# Patient Record
Sex: MALE | Race: WHITE | NOT HISPANIC OR LATINO | Employment: OTHER | URBAN - METROPOLITAN AREA
[De-identification: names, ages, dates, MRNs, and addresses within clinical notes are randomized per-mention and may not be internally consistent; named-entity substitution may affect disease eponyms.]

---

## 2017-02-03 RX ORDER — ONDANSETRON 4 MG/1
4 TABLET, ORALLY DISINTEGRATING ORAL
COMMUNITY
Start: 2016-12-03 | End: 2019-07-30

## 2017-02-03 RX ORDER — GABAPENTIN 600 MG/1
600 TABLET ORAL
COMMUNITY
Start: 2016-10-07 | End: 2019-07-30

## 2017-02-03 RX ORDER — INSULIN GLARGINE 100 [IU]/ML
20 INJECTION, SOLUTION SUBCUTANEOUS
COMMUNITY
Start: 2016-11-21 | End: 2023-04-04

## 2017-02-03 RX ORDER — OXYCODONE AND ACETAMINOPHEN 10; 325 MG/1; MG/1
TABLET ORAL
COMMUNITY
Start: 2016-12-22 | End: 2024-02-29

## 2017-02-03 RX ORDER — LUBIPROSTONE 24 UG/1
24 CAPSULE ORAL
COMMUNITY
Start: 2016-10-07 | End: 2023-04-04

## 2019-07-25 RX ORDER — PREGABALIN 100 MG/1
100 CAPSULE ORAL
COMMUNITY
Start: 2019-02-05 | End: 2023-04-04

## 2019-07-26 DIAGNOSIS — Z96.643 H/O TOTAL HIP ARTHROPLASTY, BILATERAL: Primary | ICD-10-CM

## 2019-07-30 ENCOUNTER — OFFICE VISIT (OUTPATIENT)
Dept: ORTHOPEDICS | Facility: CLINIC | Age: 64
End: 2019-07-30
Payer: MEDICARE

## 2019-07-30 ENCOUNTER — HOSPITAL ENCOUNTER (OUTPATIENT)
Dept: RADIOLOGY | Facility: HOSPITAL | Age: 64
Discharge: HOME | End: 2019-07-30
Attending: ORTHOPAEDIC SURGERY
Payer: MEDICARE

## 2019-07-30 VITALS
HEIGHT: 72 IN | SYSTOLIC BLOOD PRESSURE: 138 MMHG | HEART RATE: 112 BPM | DIASTOLIC BLOOD PRESSURE: 92 MMHG | BODY MASS INDEX: 23.03 KG/M2 | WEIGHT: 170 LBS

## 2019-07-30 DIAGNOSIS — Z96.643 H/O TOTAL HIP ARTHROPLASTY, BILATERAL: ICD-10-CM

## 2019-07-30 DIAGNOSIS — M54.16 LUMBAR RADICULOPATHY: ICD-10-CM

## 2019-07-30 DIAGNOSIS — Z96.641 HISTORY OF TOTAL RIGHT HIP ARTHROPLASTY: Primary | ICD-10-CM

## 2019-07-30 PROCEDURE — 99213 OFFICE O/P EST LOW 20 MIN: CPT | Mod: PBBFAC,25,PN | Performed by: ORTHOPAEDIC SURGERY

## 2019-07-30 PROCEDURE — 73521 XR ORTHO PELVIS_HIP POST OP BILATERAL: ICD-10-PCS | Mod: 26,,, | Performed by: RADIOLOGY

## 2019-07-30 PROCEDURE — 99999 PR PBB SHADOW E&M-EST. PATIENT-LVL III: ICD-10-PCS | Mod: PBBFAC,,, | Performed by: ORTHOPAEDIC SURGERY

## 2019-07-30 PROCEDURE — 73521 X-RAY EXAM HIPS BI 2 VIEWS: CPT | Mod: 26,,, | Performed by: RADIOLOGY

## 2019-07-30 PROCEDURE — 99999 PR PBB SHADOW E&M-EST. PATIENT-LVL III: CPT | Mod: PBBFAC,,, | Performed by: ORTHOPAEDIC SURGERY

## 2019-07-30 PROCEDURE — 99214 PR OFFICE/OUTPT VISIT, EST, LEVL IV, 30-39 MIN: ICD-10-PCS | Mod: S$PBB,,, | Performed by: ORTHOPAEDIC SURGERY

## 2019-07-30 PROCEDURE — 73521 X-RAY EXAM HIPS BI 2 VIEWS: CPT | Mod: TC,PO

## 2019-07-30 PROCEDURE — 99214 OFFICE O/P EST MOD 30 MIN: CPT | Mod: S$PBB,,, | Performed by: ORTHOPAEDIC SURGERY

## 2019-07-30 RX ORDER — AMITRIPTYLINE HYDROCHLORIDE 10 MG/1
TABLET, FILM COATED ORAL
Refills: 0 | COMMUNITY
Start: 2019-07-25 | End: 2023-04-04

## 2019-07-30 NOTE — PROGRESS NOTES
Chief Complaint   Patient presents with    Hip Pain     bilateral hip pain       HPI:   This is a 63 y.o. who returns today status post bilateral ESTRELLA at [facility] . Patient has been WBAT.  Pain is aching. No numbness or tingling. No associated signs or symptoms.    Past Medical History:   Diagnosis Date    Diabetes mellitus, type 2      Past Surgical History:   Procedure Laterality Date    HIP SURGERY Bilateral     done at [place]    JOINT REPLACEMENT      bilateral hip replacement    KNEE ARTHROSCOPY      KNEE SURGERY      right knee replacement x2,      Current Outpatient Medications on File Prior to Visit   Medication Sig Dispense Refill    amitriptyline (ELAVIL) 10 MG tablet TAKE 1 TABLET BY MOUTH 1 TO 2 TIMES A NIGHT AS NEEDED MAY CAUSE DROWSINESS  0    INSULIN ASPART (NOVOLOG SUBQ) Inject 5 Units into the skin 3 (three) times daily.      lubiprostone (AMITIZA) 24 MCG Cap Take 24 mcg by mouth.      oxycodone-acetaminophen (PERCOCET)  mg per tablet       pregabalin (LYRICA) 100 MG capsule Take 100 mg by mouth.      insulin glargine (LANTUS SOLOSTAR) 100 unit/mL (3 mL) InPn pen Inject 20 Units into the skin.       No current facility-administered medications on file prior to visit.      Review of patient's allergies indicates:  No Known Allergies  Family History   Problem Relation Age of Onset    Collagen disease Neg Hx      Social History     Socioeconomic History    Marital status:      Spouse name: Not on file    Number of children: Not on file    Years of education: Not on file    Highest education level: Not on file   Occupational History    Not on file   Social Needs    Financial resource strain: Not on file    Food insecurity:     Worry: Not on file     Inability: Not on file    Transportation needs:     Medical: Not on file     Non-medical: Not on file   Tobacco Use    Smoking status: Never Smoker    Smokeless tobacco: Never Used   Substance and Sexual Activity     Alcohol use: No     Alcohol/week: 0.0 oz    Drug use: No    Sexual activity: Not on file   Lifestyle    Physical activity:     Days per week: Not on file     Minutes per session: Not on file    Stress: Not on file   Relationships    Social connections:     Talks on phone: Not on file     Gets together: Not on file     Attends Jew service: Not on file     Active member of club or organization: Not on file     Attends meetings of clubs or organizations: Not on file     Relationship status: Not on file   Other Topics Concern    Not on file   Social History Narrative    Not on file       Review of Systems:  Constitutional:  Denies fever or chills   Eyes:  Denies change in visual acuity   HENT:  Denies nasal congestion or sore throat   Respiratory:  Denies cough or shortness of breath   Cardiovascular:  Denies chest pain or edema   GI:  Denies abdominal pain, nausea, vomiting, bloody stools or diarrhea   :  Denies dysuria   Integument:  Denies rash   Neurologic:  Denies headache, focal weakness or sensory changes   Endocrine:  Denies polyuria or polydipsia   Lymphatic:  Denies swollen glands   Psychiatric:  Denies depression or anxiety     Physical Exam:   Constitutional:  Well developed, well nourished, no acute distress, non-toxic appearance   Integument:  Well hydrated, no rash   Lymphatic:  No lymphadenopathy noted   Neurologic:  Alert & oriented x 3  Psychiatric:  Speech and behavior appropriate     bilateral Hip Exam     Tenderness   The patient is experiencing no tenderness.   Incision healed    Range of Motion   The patient has normal right hip ROM.    Muscle Strength   Flexion: 4/5     Other   Erythema: absent  Sensation: normal  Pulse: present      X-rays were performed today, personally reviewed by me and findings discussed with the patient.  4 views of the bilateral hip show implants intact in good position      There are no diagnoses linked to this encounter.    Because he is mainly  complaining of radiculopathy will refer back to Dr. Villavicencio for treatment. Will revise hip once radiculopathy improved. RTC as needed

## 2019-08-09 PROBLEM — Z96.641 HISTORY OF TOTAL RIGHT HIP ARTHROPLASTY: Status: ACTIVE | Noted: 2019-08-09

## 2019-08-09 PROBLEM — M54.16 LUMBAR RADICULOPATHY: Status: ACTIVE | Noted: 2019-08-09

## 2019-08-14 DIAGNOSIS — M25.561 RIGHT KNEE PAIN, UNSPECIFIED CHRONICITY: Primary | ICD-10-CM

## 2019-12-30 DIAGNOSIS — M25.561 RIGHT KNEE PAIN, UNSPECIFIED CHRONICITY: Primary | ICD-10-CM

## 2020-10-10 DIAGNOSIS — Z96.641 HISTORY OF TOTAL RIGHT HIP ARTHROPLASTY: Primary | ICD-10-CM

## 2020-10-21 DIAGNOSIS — Z96.643 H/O TOTAL HIP ARTHROPLASTY, BILATERAL: Primary | ICD-10-CM

## 2020-10-22 ENCOUNTER — HOSPITAL ENCOUNTER (OUTPATIENT)
Dept: RADIOLOGY | Facility: HOSPITAL | Age: 65
Discharge: HOME | End: 2020-10-22
Attending: NURSE PRACTITIONER
Payer: MEDICARE

## 2020-10-22 ENCOUNTER — OFFICE VISIT (OUTPATIENT)
Dept: ORTHOPEDICS | Facility: CLINIC | Age: 65
End: 2020-10-22
Payer: MEDICARE

## 2020-10-22 VITALS
WEIGHT: 170 LBS | HEART RATE: 51 BPM | HEIGHT: 72 IN | BODY MASS INDEX: 23.03 KG/M2 | SYSTOLIC BLOOD PRESSURE: 153 MMHG | DIASTOLIC BLOOD PRESSURE: 85 MMHG

## 2020-10-22 DIAGNOSIS — Z96.641 HISTORY OF TOTAL RIGHT HIP ARTHROPLASTY: ICD-10-CM

## 2020-10-22 DIAGNOSIS — M75.41 IMPINGEMENT SYNDROME, SHOULDER, RIGHT: ICD-10-CM

## 2020-10-22 DIAGNOSIS — M70.61 GREATER TROCHANTERIC BURSITIS OF RIGHT HIP: Primary | ICD-10-CM

## 2020-10-22 DIAGNOSIS — M25.511 RIGHT SHOULDER PAIN: ICD-10-CM

## 2020-10-22 PROBLEM — M70.62 GREATER TROCHANTERIC BURSITIS OF LEFT HIP: Status: ACTIVE | Noted: 2020-10-22

## 2020-10-22 PROCEDURE — 73521 XR ORTHO PELVIS_HIP POST OP BILATERAL: ICD-10-PCS | Mod: 26,,, | Performed by: RADIOLOGY

## 2020-10-22 PROCEDURE — 20610 DRAIN/INJ JOINT/BURSA W/O US: CPT | Mod: PBBFAC,PN | Performed by: ORTHOPAEDIC SURGERY

## 2020-10-22 PROCEDURE — 73030 X-RAY EXAM OF SHOULDER: CPT | Mod: 26,RT,, | Performed by: RADIOLOGY

## 2020-10-22 PROCEDURE — 73521 X-RAY EXAM HIPS BI 2 VIEWS: CPT | Mod: 26,,, | Performed by: RADIOLOGY

## 2020-10-22 PROCEDURE — 99999 PR PBB SHADOW E&M-EST. PATIENT-LVL III: ICD-10-PCS | Mod: PBBFAC,,, | Performed by: ORTHOPAEDIC SURGERY

## 2020-10-22 PROCEDURE — 99213 OFFICE O/P EST LOW 20 MIN: CPT | Mod: PBBFAC,25,PN | Performed by: ORTHOPAEDIC SURGERY

## 2020-10-22 PROCEDURE — 99214 OFFICE O/P EST MOD 30 MIN: CPT | Mod: S$PBB,25,, | Performed by: ORTHOPAEDIC SURGERY

## 2020-10-22 PROCEDURE — 99214 PR OFFICE/OUTPT VISIT, EST, LEVL IV, 30-39 MIN: ICD-10-PCS | Mod: S$PBB,25,, | Performed by: ORTHOPAEDIC SURGERY

## 2020-10-22 PROCEDURE — 73030 X-RAY EXAM OF SHOULDER: CPT | Mod: TC,PO,RT

## 2020-10-22 PROCEDURE — 73521 X-RAY EXAM HIPS BI 2 VIEWS: CPT | Mod: TC,PO

## 2020-10-22 PROCEDURE — 99999 PR PBB SHADOW E&M-EST. PATIENT-LVL III: CPT | Mod: PBBFAC,,, | Performed by: ORTHOPAEDIC SURGERY

## 2020-10-22 PROCEDURE — 20610 LARGE JOINT ASPIRATION/INJECTION: R SUBACROMIAL BURSA: ICD-10-PCS | Mod: S$PBB,RT,, | Performed by: ORTHOPAEDIC SURGERY

## 2020-10-22 PROCEDURE — 73030 XR SHOULDER TRAUMA 3 VIEW RIGHT: ICD-10-PCS | Mod: 26,RT,, | Performed by: RADIOLOGY

## 2020-10-22 RX ORDER — OXYCODONE HYDROCHLORIDE 15 MG/1
TABLET ORAL
COMMUNITY
Start: 2020-10-07

## 2020-10-22 RX ORDER — METFORMIN HYDROCHLORIDE 1000 MG/1
1000 TABLET ORAL 2 TIMES DAILY WITH MEALS
COMMUNITY
End: 2024-02-29

## 2020-10-22 RX ORDER — GABAPENTIN 100 MG/1
100 CAPSULE ORAL 4 TIMES DAILY
COMMUNITY
Start: 2020-09-29 | End: 2023-04-04

## 2020-10-22 RX ORDER — ATORVASTATIN CALCIUM 40 MG/1
TABLET, FILM COATED ORAL
COMMUNITY
Start: 2020-07-15

## 2020-10-22 RX ADMIN — TRIAMCINOLONE ACETONIDE 40 MG: 40 INJECTION, SUSPENSION INTRA-ARTICULAR; INTRAMUSCULAR at 08:10

## 2020-10-22 NOTE — PROGRESS NOTES
Chief Complaint   Patient presents with    Right Shoulder - Pain    Left Hip - Pain    Right Hip - Pain      HPI:   This is a 65 y.o. who presents to clinic today for right hip and right shoulder pain for the past 1 months after no known trauma. Complains of pain while sleeping on side and when descending stairs. Pain is dull. No numbness or tingling. No associated signs or symptoms. He is status post bilateral ESTRELLA at [place].       Past Medical History:   Diagnosis Date    Diabetes mellitus, type 2      Past Surgical History:   Procedure Laterality Date    HIP SURGERY Bilateral     done at [place]    JOINT REPLACEMENT      bilateral hip replacement    KNEE ARTHROSCOPY      KNEE SURGERY      right knee replacement x2,      Current Outpatient Medications on File Prior to Visit   Medication Sig Dispense Refill    amitriptyline (ELAVIL) 10 MG tablet TAKE 1 TABLET BY MOUTH 1 TO 2 TIMES A NIGHT AS NEEDED MAY CAUSE DROWSINESS  0    gabapentin (NEURONTIN) 100 MG capsule Take 100 mg by mouth 4 (four) times daily.      INSULIN ASPART (NOVOLOG SUBQ) Inject 5 Units into the skin 3 (three) times daily.      lubiprostone (AMITIZA) 24 MCG Cap Take 24 mcg by mouth.      metFORMIN (GLUCOPHAGE) 1000 MG tablet Take 1,000 mg by mouth 2 (two) times daily with meals.      oxyCODONE (ROXICODONE) 15 MG Tab TAKE ONE TABLET BY MOUTH EVERY TWELVE HOURS AS NEEDED FOR PAIN MAY CAUSE DROWSINESS CAN FILL 10-7-20      atorvastatin (LIPITOR) 40 MG tablet Take 1 tablet (40 mg total) by mouth daily      insulin glargine (LANTUS SOLOSTAR) 100 unit/mL (3 mL) InPn pen Inject 20 Units into the skin.      oxycodone-acetaminophen (PERCOCET)  mg per tablet       pregabalin (LYRICA) 100 MG capsule Take 100 mg by mouth.       No current facility-administered medications on file prior to visit.      Review of patient's allergies indicates:  No Known Allergies  Family History   Problem Relation Age of Onset    Collagen  disease Neg Hx      Social History     Socioeconomic History    Marital status:      Spouse name: Not on file    Number of children: Not on file    Years of education: Not on file    Highest education level: Not on file   Occupational History    Not on file   Social Needs    Financial resource strain: Not on file    Food insecurity     Worry: Not on file     Inability: Not on file    Transportation needs     Medical: Not on file     Non-medical: Not on file   Tobacco Use    Smoking status: Never Smoker    Smokeless tobacco: Never Used   Substance and Sexual Activity    Alcohol use: No     Alcohol/week: 0.0 standard drinks    Drug use: No    Sexual activity: Not on file   Lifestyle    Physical activity     Days per week: Not on file     Minutes per session: Not on file    Stress: Not on file   Relationships    Social connections     Talks on phone: Not on file     Gets together: Not on file     Attends Worship service: Not on file     Active member of club or organization: Not on file     Attends meetings of clubs or organizations: Not on file     Relationship status: Not on file   Other Topics Concern    Not on file   Social History Narrative    Not on file       Review of Systems:  Constitutional:  Denies fever or chills   Eyes:  Denies change in visual acuity   HENT:  Denies nasal congestion or sore throat   Respiratory:  Denies cough or shortness of breath   Cardiovascular:  Denies chest pain or edema   GI:  Denies abdominal pain, nausea, vomiting, bloody stools or diarrhea   :  Denies dysuria   Integument:  Denies rash   Neurologic:  Denies headache, focal weakness or sensory changes   Endocrine:  Denies polyuria or polydipsia   Lymphatic:  Denies swollen glands   Psychiatric:  Denies depression or anxiety     Physical Exam:   Constitutional:  Well developed, well nourished, no acute distress, non-toxic appearance   Integument:  Well hydrated, no rash   Lymphatic:  No lymphadenopathy  noted   Neurologic:  Alert & oriented x 3  Psychiatric:  Speech and behavior appropriate     Bilateral Shoulder Exam    left Shoulder Exam   Shoulder exam performed same as contralateral side and is normal.    right Shoulder Exam   Tenderness   Shoulder tenderness location: diffusely about shoulder.    Range of Motion   Forward Flexion: abnormal   External Rotation: abnormal     Muscle Strength   Supraspinatus: 4/5     Tests   Hawkin's test: positive  Impingement: positive    Other   Erythema: absent  Sensation: normal  Pulse: present         Bilateral Hip Exam    Right hip    Tenderness   The patient is experiencing tenderness in the greater trochanter.    Range of Motion   The patient has normal hip ROM.    Muscle Strength   Abduction: 4/5   Other   Erythema: absent  Sensation: normal  Pulse: present      Left hip  Exam performed same as contralateral side and is normal        X-rays were performed today, personally reviewed by me and findings discussed with the patient.  4 views of the bilateral hip show implants intact in good position    Greater trochanteric bursitis of right hip  -     Large Joint Aspiration/Injection: R greater trochanteric bursa    Impingement syndrome, shoulder, right  -     Large Joint Aspiration/Injection: R subacromial bursa           Using an aseptic technique, I injected 5 cc of lidocaine 1% without and 1 cc of kenalog 40mg into the right Hip and right shoulder. The patient tolerated this well. I will have them return to clinic in 6 weeks.

## 2020-10-22 NOTE — PROCEDURES
Large Joint Aspiration/Injection: R greater trochanteric bursa    Date/Time: 10/22/2020 8:45 AM  Performed by: Jorge Trevizo MD  Authorized by: Jorge Trevizo MD     Consent Done?:  Yes (Verbal)  Indications:  Pain  Timeout: prior to procedure the correct patient, procedure, and site was verified    Prep: patient was prepped and draped in usual sterile fashion      Local anesthesia used?: Yes    Local anesthetic:  Lidocaine 1% without epinephrine  Anesthetic total (ml):  5      Details:  Needle Size:  22 G  Approach:  Lateral  Location:  Hip  Site:  R greater trochanteric bursa  Medications:  40 mg triamcinolone acetonide 40 mg/mL  Patient tolerance:  Patient tolerated the procedure well with no immediate complications

## 2020-10-22 NOTE — PROCEDURES
Large Joint Aspiration/Injection: R subacromial bursa    Date/Time: 10/22/2020 8:45 AM  Performed by: Jorge Trevizo MD  Authorized by: Jorge Trevizo MD     Consent Done?:  Yes (Verbal)  Indications:  Pain  Timeout: prior to procedure the correct patient, procedure, and site was verified    Prep: patient was prepped and draped in usual sterile fashion      Local anesthesia used?: Yes    Local anesthetic:  Lidocaine 1% without epinephrine  Anesthetic total (ml):  5      Details:  Needle Size:  22 G  Ultrasonic Guidance for needle placement?: No    Approach:  Posterior  Location:  Shoulder  Site:  R subacromial bursa  Medications:  40 mg triamcinolone acetonide 40 mg/mL  Patient tolerance:  Patient tolerated the procedure well with no immediate complications

## 2020-11-06 RX ORDER — TRIAMCINOLONE ACETONIDE 40 MG/ML
40 INJECTION, SUSPENSION INTRA-ARTICULAR; INTRAMUSCULAR
Status: DISCONTINUED | OUTPATIENT
Start: 2020-10-22 | End: 2020-11-06 | Stop reason: HOSPADM

## 2021-05-04 ENCOUNTER — PATIENT MESSAGE (OUTPATIENT)
Dept: RESEARCH | Facility: HOSPITAL | Age: 66
End: 2021-05-04

## 2021-11-22 ENCOUNTER — PATIENT MESSAGE (OUTPATIENT)
Dept: ORTHOPEDICS | Facility: CLINIC | Age: 66
End: 2021-11-22
Payer: MEDICARE

## 2021-12-06 DIAGNOSIS — G89.29 CHRONIC PAIN OF RIGHT KNEE: Primary | ICD-10-CM

## 2021-12-06 DIAGNOSIS — M25.561 CHRONIC PAIN OF RIGHT KNEE: Primary | ICD-10-CM

## 2022-01-20 DIAGNOSIS — M79.604 PAIN OF RIGHT LOWER EXTREMITY: Primary | ICD-10-CM

## 2023-01-03 DIAGNOSIS — M25.551 BILATERAL HIP PAIN: Primary | ICD-10-CM

## 2023-01-03 DIAGNOSIS — M25.552 BILATERAL HIP PAIN: Primary | ICD-10-CM

## 2023-03-28 DIAGNOSIS — M25.561 CHRONIC PAIN OF RIGHT KNEE: ICD-10-CM

## 2023-03-28 DIAGNOSIS — M25.552 BILATERAL HIP PAIN: Primary | ICD-10-CM

## 2023-03-28 DIAGNOSIS — G89.29 CHRONIC PAIN OF RIGHT KNEE: ICD-10-CM

## 2023-03-28 DIAGNOSIS — M25.551 BILATERAL HIP PAIN: Primary | ICD-10-CM

## 2023-04-04 ENCOUNTER — HOSPITAL ENCOUNTER (OUTPATIENT)
Dept: RADIOLOGY | Facility: HOSPITAL | Age: 68
Discharge: HOME | End: 2023-04-04
Attending: ORTHOPAEDIC SURGERY
Payer: MEDICARE

## 2023-04-04 ENCOUNTER — OFFICE VISIT (OUTPATIENT)
Dept: ORTHOPEDICS | Facility: CLINIC | Age: 68
End: 2023-04-04
Payer: MEDICARE

## 2023-04-04 VITALS — WEIGHT: 171 LBS | HEIGHT: 72 IN | BODY MASS INDEX: 23.16 KG/M2

## 2023-04-04 DIAGNOSIS — G89.29 CHRONIC PAIN OF RIGHT KNEE: ICD-10-CM

## 2023-04-04 DIAGNOSIS — M76.892 ADDUCTOR TENDINITIS OF LEFT HIP: ICD-10-CM

## 2023-04-04 DIAGNOSIS — M25.551 BILATERAL HIP PAIN: ICD-10-CM

## 2023-04-04 DIAGNOSIS — M25.552 BILATERAL HIP PAIN: ICD-10-CM

## 2023-04-04 DIAGNOSIS — M70.62 GREATER TROCHANTERIC BURSITIS OF LEFT HIP: Primary | ICD-10-CM

## 2023-04-04 DIAGNOSIS — M25.561 CHRONIC PAIN OF RIGHT KNEE: ICD-10-CM

## 2023-04-04 PROCEDURE — 20610 DRAIN/INJ JOINT/BURSA W/O US: CPT | Mod: PBBFAC,PN,LT | Performed by: ORTHOPAEDIC SURGERY

## 2023-04-04 PROCEDURE — 73560 X-RAY EXAM OF KNEE 1 OR 2: CPT | Mod: 26,LT,, | Performed by: RADIOLOGY

## 2023-04-04 PROCEDURE — 99214 OFFICE O/P EST MOD 30 MIN: CPT | Mod: S$PBB,25,, | Performed by: ORTHOPAEDIC SURGERY

## 2023-04-04 PROCEDURE — 20610 LARGE JOINT ASPIRATION/INJECTION: L GREATER TROCHANTERIC BURSA: ICD-10-PCS | Mod: S$PBB,LT,, | Performed by: ORTHOPAEDIC SURGERY

## 2023-04-04 PROCEDURE — 99999 PR PBB SHADOW E&M-EST. PATIENT-LVL III: ICD-10-PCS | Mod: PBBFAC,,, | Performed by: ORTHOPAEDIC SURGERY

## 2023-04-04 PROCEDURE — 73560 XR KNEE ORTHO RIGHT: ICD-10-PCS | Mod: 26,LT,, | Performed by: RADIOLOGY

## 2023-04-04 PROCEDURE — 99999 PR PBB SHADOW E&M-EST. PATIENT-LVL III: CPT | Mod: PBBFAC,,, | Performed by: ORTHOPAEDIC SURGERY

## 2023-04-04 PROCEDURE — 73562 XR KNEE ORTHO RIGHT: ICD-10-PCS | Mod: 26,RT,, | Performed by: RADIOLOGY

## 2023-04-04 PROCEDURE — 73560 X-RAY EXAM OF KNEE 1 OR 2: CPT | Mod: TC,PO,LT

## 2023-04-04 PROCEDURE — 99213 OFFICE O/P EST LOW 20 MIN: CPT | Mod: PBBFAC,PN,25 | Performed by: ORTHOPAEDIC SURGERY

## 2023-04-04 PROCEDURE — 73521 X-RAY EXAM HIPS BI 2 VIEWS: CPT | Mod: 26,,, | Performed by: RADIOLOGY

## 2023-04-04 PROCEDURE — 73562 X-RAY EXAM OF KNEE 3: CPT | Mod: 26,RT,, | Performed by: RADIOLOGY

## 2023-04-04 PROCEDURE — 99214 PR OFFICE/OUTPT VISIT, EST, LEVL IV, 30-39 MIN: ICD-10-PCS | Mod: S$PBB,25,, | Performed by: ORTHOPAEDIC SURGERY

## 2023-04-04 PROCEDURE — 73521 X-RAY EXAM HIPS BI 2 VIEWS: CPT | Mod: TC,PO

## 2023-04-04 PROCEDURE — 73521 XR ORTHO PELVIS_HIP POST OP BILATERAL: ICD-10-PCS | Mod: 26,,, | Performed by: RADIOLOGY

## 2023-04-04 RX ORDER — FLASH GLUCOSE SENSOR
1 KIT MISCELLANEOUS
COMMUNITY
Start: 2023-01-23

## 2023-04-04 RX ORDER — ROSUVASTATIN CALCIUM 40 MG/1
40 TABLET, COATED ORAL
COMMUNITY
Start: 2023-02-02

## 2023-04-04 RX ORDER — FLASH GLUCOSE SCANNING READER
1 EACH MISCELLANEOUS
COMMUNITY
Start: 2023-01-23

## 2023-04-04 RX ORDER — METHOCARBAMOL 750 MG/1
750 TABLET, FILM COATED ORAL 4 TIMES DAILY PRN
Qty: 44 TABLET | Refills: 3 | Status: SHIPPED | OUTPATIENT
Start: 2023-04-04 | End: 2023-07-17

## 2023-04-04 RX ADMIN — TRIAMCINOLONE ACETONIDE 40 MG: 40 INJECTION, SUSPENSION INTRA-ARTICULAR; INTRAMUSCULAR at 02:04

## 2023-04-11 RX ORDER — TRIAMCINOLONE ACETONIDE 40 MG/ML
40 INJECTION, SUSPENSION INTRA-ARTICULAR; INTRAMUSCULAR
Status: DISCONTINUED | OUTPATIENT
Start: 2023-04-04 | End: 2023-04-11 | Stop reason: HOSPADM

## 2023-04-11 NOTE — PROGRESS NOTES
Chief Complaint   Patient presents with    Right Knee - Pain    Right Hip - Pain    Left Hip - Pain      HPI:   This is a 67 y.o. who presents to clinic today for left hip pain for the past 2 months after no known trauma. Complains of pain while sleeping on side and when descending stairs. Pain is dull. No numbness or tingling. No associated signs or symptoms.      Past Medical History:   Diagnosis Date    Diabetes mellitus, type 2      Past Surgical History:   Procedure Laterality Date    ENDOSCOPIC ULTRASOUND OF UPPER GASTROINTESTINAL TRACT Left 11/23/2022    Procedure: ULTRASOUND, UPPER GI TRACT, ENDOSCOPIC;  Surgeon: Zain Hernandez MD;  Location: [facility];  Service: Endoscopy;  Laterality: Left;  GIF plus Linear    ESOPHAGOGASTRODUODENOSCOPY N/A 8/31/2022    Procedure: EGD (ESOPHAGOGASTRODUODENOSCOPY);  Surgeon: Zain Hernandez MD;  Location: [facility];  Service: Endoscopy;  Laterality: N/A;    ESOPHAGOGASTRODUODENOSCOPY N/A 11/23/2022    Procedure: EGD (ESOPHAGOGASTRODUODENOSCOPY);  Surgeon: Zain Hernandez MD;  Location: [facility];  Service: Endoscopy;  Laterality: N/A;    HIP SURGERY Bilateral     done at [place]    JOINT REPLACEMENT      bilateral hip replacement    KNEE ARTHROSCOPY      KNEE SURGERY      right knee replacement x2,      Current Outpatient Medications on File Prior to Visit   Medication Sig Dispense Refill    atorvastatin (LIPITOR) 40 MG tablet Take 1 tablet (40 mg total) by mouth daily      flash glucose scanning reader (FREESTYLE NATALIIA 2 READER) Misc 1 each by Other route.      flash glucose sensor (FREESTYLE NATALIIA 2 SENSOR) Kit 1 each by Other route.      metFORMIN (GLUCOPHAGE) 1000 MG tablet Take 1,000 mg by mouth 2 (two) times daily with meals.      oxyCODONE (ROXICODONE) 15 MG Tab TAKE ONE TABLET BY MOUTH EVERY TWELVE HOURS AS NEEDED FOR PAIN MAY CAUSE DROWSINESS CAN FILL 10-7-20      oxycodone-acetaminophen (PERCOCET)  mg per tablet       pantoprazole  (PROTONIX) 40 MG tablet Take 40 mg by mouth once daily.      rosuvastatin (CRESTOR) 40 MG Tab 40 mg.      sacubitriL-valsartan (ENTRESTO) 24-26 mg per tablet TAKE ONE 24/26MG TABLET BY MOUTH TWICE A DAY FOR CHRONIC HEART FAILURE       No current facility-administered medications on file prior to visit.     Review of patient's allergies indicates:  No Known Allergies  Family History   Problem Relation Age of Onset    Collagen disease Neg Hx      Social History     Socioeconomic History    Marital status:    Tobacco Use    Smoking status: Never    Smokeless tobacco: Never   Substance and Sexual Activity    Alcohol use: No     Alcohol/week: 0.0 standard drinks    Drug use: No       Review of Systems:  Constitutional:  Denies fever or chills   Eyes:  Denies change in visual acuity   HENT:  Denies nasal congestion or sore throat   Respiratory:  Denies cough or shortness of breath   Cardiovascular:  Denies chest pain or edema   GI:  Denies abdominal pain, nausea, vomiting, bloody stools or diarrhea   :  Denies dysuria   Integument:  Denies rash   Neurologic:  Denies headache, focal weakness or sensory changes   Endocrine:  Denies polyuria or polydipsia   Lymphatic:  Denies swollen glands   Psychiatric:  Denies depression or anxiety     Physical Exam:   Constitutional:  Well developed, well nourished, no acute distress, non-toxic appearance   Integument:  Well hydrated, no rash   Lymphatic:  No lymphadenopathy noted   Neurologic:  Alert & oriented x 3  Psychiatric:  Speech and behavior appropriate     Bilateral Hip Exam    right Hip Exam   right hip exam performed same as contralateral hip and is normal.     left Hip Exam   Tenderness   The patient is experiencing tenderness in the greater trochanter.  Range of Motion   The patient has normal hip ROM.  Muscle Strength   Abduction: 4/5   Other   Erythema: absent  Sensation: normal  Pulse: present    X-rays were personally reviewed by me and findings discussed with  the patient.  2 views of the left hip show no fractures or dislocations    Greater trochanteric bursitis of left hip    Adductor tendinitis of left hip    Other orders  -     methocarbamoL (ROBAXIN) 750 MG Tab; Take 1 tablet (750 mg total) by mouth 4 (four) times daily as needed.  Dispense: 44 tablet; Refill: 3           Using an aseptic technique, I injected 5 cc of lidocaine 1% without and 1 cc of kenalog 40mg into the left Hip and 1:1 in the adductor. The patient tolerated this well. I will have them return to clinic in 2 months.

## 2023-04-11 NOTE — PROCEDURES
Large Joint Aspiration/Injection: L greater trochanteric bursa    Date/Time: 4/4/2023 2:30 PM  Performed by: Jorge Trevizo MD  Authorized by: Jorge Trevizo MD     Consent Done?:  Yes (Verbal)  Indications:  Pain  Timeout: prior to procedure the correct patient, procedure, and site was verified    Prep: patient was prepped and draped in usual sterile fashion    Local anesthetic:  Lidocaine 1% without epinephrine  Anesthetic total (ml):  5      Details:  Needle Size:  21 G  Approach:  Lateral  Location:  Hip  Site:  L greater trochanteric bursa  Medications:  40 mg triamcinolone acetonide 40 mg/mL  Patient tolerance:  Patient tolerated the procedure well with no immediate complications  Tendon Sheath    Date/Time: 4/4/2023 2:30 PM  Performed by: Jorge Trevizo MD  Authorized by: Jorge Trevizo MD     Consent Done?:  Yes (Verbal)  Indications:  Pain  Site marked: the procedure site was marked    Timeout: prior to procedure the correct patient, procedure, and site was verified    Prep: patient was prepped and draped in usual sterile fashion      Needle size:  25 G  Medications:  40 mg triamcinolone acetonide 40 mg/mL  Patient tolerance:  Patient tolerated the procedure well with no immediate complications

## 2023-04-27 DIAGNOSIS — M54.16 LUMBAR RADICULOPATHY: Primary | ICD-10-CM

## 2023-06-06 ENCOUNTER — OFFICE VISIT (OUTPATIENT)
Dept: ORTHOPEDICS | Facility: CLINIC | Age: 68
End: 2023-06-06
Payer: MEDICARE

## 2023-06-06 VITALS — WEIGHT: 152 LBS | BODY MASS INDEX: 20.59 KG/M2 | HEIGHT: 72 IN

## 2023-06-06 DIAGNOSIS — M54.16 LUMBAR RADICULOPATHY: Primary | ICD-10-CM

## 2023-06-06 PROCEDURE — 99214 OFFICE O/P EST MOD 30 MIN: CPT | Mod: S$PBB,,, | Performed by: ORTHOPAEDIC SURGERY

## 2023-06-06 PROCEDURE — 99999 PR PBB SHADOW E&M-EST. PATIENT-LVL II: CPT | Mod: PBBFAC,,, | Performed by: ORTHOPAEDIC SURGERY

## 2023-06-06 PROCEDURE — 99214 PR OFFICE/OUTPT VISIT, EST, LEVL IV, 30-39 MIN: ICD-10-PCS | Mod: S$PBB,,, | Performed by: ORTHOPAEDIC SURGERY

## 2023-06-06 PROCEDURE — 99999 PR PBB SHADOW E&M-EST. PATIENT-LVL II: ICD-10-PCS | Mod: PBBFAC,,, | Performed by: ORTHOPAEDIC SURGERY

## 2023-06-06 PROCEDURE — 99212 OFFICE O/P EST SF 10 MIN: CPT | Mod: PBBFAC,PN | Performed by: ORTHOPAEDIC SURGERY

## 2023-06-06 NOTE — PROGRESS NOTES
Chief Complaint   Patient presents with    Right Knee - Pain       HPI:    This is a 67 y.o. who presents today complaining of bilateral leg pain for 2 weeks after no known trauma. He hnotes no relief from last injections. Pain is dull. No numbness or tingling. No associated signs or symptoms.      Past Medical History:   Diagnosis Date    Diabetes mellitus, type 2       Past Surgical History:   Procedure Laterality Date    ENDOSCOPIC ULTRASOUND OF UPPER GASTROINTESTINAL TRACT Left 11/23/2022    Procedure: ULTRASOUND, UPPER GI TRACT, ENDOSCOPIC;  Surgeon: Zain Hernandez MD;  Location: [facility];  Service: Endoscopy;  Laterality: Left;  GIF plus Linear    ESOPHAGOGASTRODUODENOSCOPY N/A 8/31/2022    Procedure: EGD (ESOPHAGOGASTRODUODENOSCOPY);  Surgeon: Zain Hernandez MD;  Location: [facility];  Service: Endoscopy;  Laterality: N/A;    ESOPHAGOGASTRODUODENOSCOPY N/A 11/23/2022    Procedure: EGD (ESOPHAGOGASTRODUODENOSCOPY);  Surgeon: Zain Hernandez MD;  Location: [facility];  Service: Endoscopy;  Laterality: N/A;    HIP SURGERY Bilateral     done at [place]    JOINT REPLACEMENT      bilateral hip replacement    KNEE ARTHROSCOPY      KNEE SURGERY      right knee replacement x2,       Current Outpatient Medications on File Prior to Visit   Medication Sig Dispense Refill    atorvastatin (LIPITOR) 40 MG tablet Take 1 tablet (40 mg total) by mouth daily      flash glucose scanning reader (FREESTYLE NATALIIA 2 READER) Misc 1 each by Other route.      flash glucose sensor (FREESTYLE NATALIIA 2 SENSOR) Kit 1 each by Other route.      metFORMIN (GLUCOPHAGE) 1000 MG tablet Take 1,000 mg by mouth 2 (two) times daily with meals.      methocarbamoL (ROBAXIN) 750 MG Tab Take 1 tablet (750 mg total) by mouth 4 (four) times daily as needed. 44 tablet 3    oxyCODONE (ROXICODONE) 15 MG Tab TAKE ONE TABLET BY MOUTH EVERY TWELVE HOURS AS NEEDED FOR PAIN MAY CAUSE DROWSINESS CAN FILL 10-7-20      oxycodone-acetaminophen  (PERCOCET)  mg per tablet       pantoprazole (PROTONIX) 40 MG tablet Take 40 mg by mouth once daily.      rosuvastatin (CRESTOR) 40 MG Tab 40 mg.      sacubitriL-valsartan (ENTRESTO) 24-26 mg per tablet TAKE ONE 24/26MG TABLET BY MOUTH TWICE A DAY FOR CHRONIC HEART FAILURE       No current facility-administered medications on file prior to visit.      Review of patient's allergies indicates:  No Known Allergies   Family History not pertinent   Social History     Socioeconomic History    Marital status:    Tobacco Use    Smoking status: Never    Smokeless tobacco: Never   Substance and Sexual Activity    Alcohol use: No     Alcohol/week: 0.0 standard drinks    Drug use: No         Review of Systems:   Constitutional:  Denies fever or chills    Eyes:  Denies change in visual acuity    HENT:  Denies nasal congestion or sore throat    Respiratory:  Denies cough or shortness of breath    Cardiovascular:  Denies chest pain or edema    GI:  Denies abdominal pain, nausea, vomiting, bloody stools or diarrhea    :  Denies dysuria    Integument:  Denies rash    Neurologic:  Denies headache, focal weakness or sensory changes    Endocrine:  Denies polyuria or polydipsia    Lymphatic:  Denies swollen glands    Psychiatric:  Denies depression or anxiety       Physical Exam:    Constitutional:  Well developed, well nourished, no acute distress, non-toxic appearance    Integument:  Well hydrated, no rash    Lymphatic:  No lymphadenopathy noted    Neurologic:  Alert & oriented x 3,     Psychiatric:  Speech and behavior appropriate    Gi: abdomen soft  Eyes: EOMI   L spine  Point TTP about the bilateral sacral ala. Pain with provocative testing. Skin intact. Compartments soft. Nvi distally.      Lumbar radiculopathy        He will get the sacral scan per Dr. Villavicencio. RTC as needed.

## 2023-07-17 RX ORDER — METHOCARBAMOL 750 MG/1
750 TABLET, FILM COATED ORAL 4 TIMES DAILY PRN
Qty: 44 TABLET | Refills: 3 | Status: SHIPPED | OUTPATIENT
Start: 2023-07-17 | End: 2024-02-29

## 2024-02-29 ENCOUNTER — OFFICE VISIT (OUTPATIENT)
Dept: GASTROENTEROLOGY | Facility: CLINIC | Age: 69
End: 2024-02-29
Payer: OTHER GOVERNMENT

## 2024-02-29 VITALS — BODY MASS INDEX: 23.05 KG/M2 | HEIGHT: 72 IN | WEIGHT: 170.19 LBS

## 2024-02-29 DIAGNOSIS — K86.2 PANCREATIC CYST: ICD-10-CM

## 2024-02-29 DIAGNOSIS — Z87.19 HISTORY OF CHRONIC CONSTIPATION: ICD-10-CM

## 2024-02-29 DIAGNOSIS — Z79.01 ANTICOAGULANT LONG-TERM USE: ICD-10-CM

## 2024-02-29 DIAGNOSIS — Z86.2 HISTORY OF ANEMIA: Primary | ICD-10-CM

## 2024-02-29 DIAGNOSIS — Z86.010 HISTORY OF COLON POLYPS: ICD-10-CM

## 2024-02-29 PROBLEM — G56.02 CARPAL TUNNEL SYNDROME OF LEFT WRIST: Status: ACTIVE | Noted: 2017-05-18

## 2024-02-29 PROBLEM — K80.20 GALLSTONES: Status: ACTIVE | Noted: 2024-02-29

## 2024-02-29 PROBLEM — I50.20 HEART FAILURE WITH REDUCED EJECTION FRACTION, NYHA CLASS II: Status: ACTIVE | Noted: 2023-01-31

## 2024-02-29 PROBLEM — F17.220 TOBACCO DEPENDENCE DUE TO CHEWING TOBACCO: Status: ACTIVE | Noted: 2024-02-29

## 2024-02-29 PROBLEM — G56.22 CUBITAL TUNNEL SYNDROME ON LEFT: Status: ACTIVE | Noted: 2017-07-26

## 2024-02-29 PROBLEM — E11.59 CORONARY ARTERY DISEASE DUE TO TYPE 2 DIABETES MELLITUS: Status: ACTIVE | Noted: 2023-05-03

## 2024-02-29 PROBLEM — G56.20 ULNAR NERVE LESION: Status: ACTIVE | Noted: 2024-02-29

## 2024-02-29 PROBLEM — Z82.49 FAMILY HISTORY OF CORONARY ARTERY DISEASE: Status: ACTIVE | Noted: 2023-01-31

## 2024-02-29 PROBLEM — M20.30 ACQUIRED HALLUX MALLEUS: Status: ACTIVE | Noted: 2024-02-29

## 2024-02-29 PROBLEM — D64.9 ANEMIA: Status: ACTIVE | Noted: 2024-02-29

## 2024-02-29 PROBLEM — J45.909 ASTHMA: Status: ACTIVE | Noted: 2024-02-29

## 2024-02-29 PROBLEM — M46.1 SI JOINT ARTHRITIS: Status: ACTIVE | Noted: 2018-02-27

## 2024-02-29 PROBLEM — D50.9 IRON DEFICIENCY ANEMIA: Status: ACTIVE | Noted: 2024-02-29

## 2024-02-29 PROBLEM — I25.10 CORONARY ARTERY DISEASE DUE TO TYPE 2 DIABETES MELLITUS: Status: ACTIVE | Noted: 2023-05-03

## 2024-02-29 PROBLEM — I20.0 PROGRESSIVE ANGINA: Status: ACTIVE | Noted: 2023-05-03

## 2024-02-29 PROBLEM — S32.009K LUMBAR PSEUDOARTHROSIS: Status: ACTIVE | Noted: 2023-10-24

## 2024-02-29 PROBLEM — G62.9 NEUROPATHY: Status: ACTIVE | Noted: 2024-02-29

## 2024-02-29 PROBLEM — I10 PRIMARY HYPERTENSION: Status: ACTIVE | Noted: 2024-02-29

## 2024-02-29 PROBLEM — M75.32 CALCIFIC TENDINITIS OF LEFT SHOULDER: Status: ACTIVE | Noted: 2018-10-15

## 2024-02-29 PROBLEM — I82.401 RIGHT LEG DVT: Status: ACTIVE | Noted: 2024-02-29

## 2024-02-29 PROBLEM — Z95.1 STATUS POST AORTO-CORONARY ARTERY BYPASS GRAFT: Status: ACTIVE | Noted: 2023-05-07

## 2024-02-29 PROBLEM — D12.6 BENIGN NEOPLASM OF COLON: Status: ACTIVE | Noted: 2024-02-29

## 2024-02-29 PROBLEM — J40 BRONCHITIS: Status: ACTIVE | Noted: 2024-02-29

## 2024-02-29 PROBLEM — M21.371 RIGHT FOOT DROP: Status: ACTIVE | Noted: 2024-02-29

## 2024-02-29 PROBLEM — M19.90 OSTEOARTHRITIS: Status: ACTIVE | Noted: 2024-02-29

## 2024-02-29 PROBLEM — T46.6X5A ADVERSE EFFECT OF ATORVASTATIN: Status: ACTIVE | Noted: 2017-11-15

## 2024-02-29 PROBLEM — E11.9 TYPE 2 DIABETES MELLITUS, WITHOUT LONG-TERM CURRENT USE OF INSULIN: Status: ACTIVE | Noted: 2024-02-29

## 2024-02-29 PROBLEM — Z79.891 LONG TERM (CURRENT) USE OF OPIATE ANALGESIC: Status: ACTIVE | Noted: 2017-11-15

## 2024-02-29 PROBLEM — M67.40 GANGLION CYST: Status: ACTIVE | Noted: 2024-02-29

## 2024-02-29 PROBLEM — E11.9 TYPE 2 DIABETES MELLITUS: Status: ACTIVE | Noted: 2024-02-29

## 2024-02-29 PROBLEM — H90.3 BILATERAL SENSORINEURAL HEARING LOSS: Status: ACTIVE | Noted: 2024-02-29

## 2024-02-29 PROBLEM — M47.818 SI JOINT ARTHRITIS: Status: ACTIVE | Noted: 2018-02-27

## 2024-02-29 PROBLEM — E78.5 HYPERLIPIDEMIA: Status: ACTIVE | Noted: 2024-02-29

## 2024-02-29 PROBLEM — E53.8 VITAMIN B12 DEFICIENCY: Status: ACTIVE | Noted: 2024-02-29

## 2024-02-29 PROBLEM — E11.3299 MILD NONPROLIFERATIVE DIABETIC RETINOPATHY: Status: ACTIVE | Noted: 2024-02-29

## 2024-02-29 PROBLEM — E11.9 DIABETES MELLITUS WITHOUT COMPLICATION: Status: ACTIVE | Noted: 2022-06-09

## 2024-02-29 PROBLEM — Z96.1 PSEUDOPHAKIA: Status: ACTIVE | Noted: 2017-11-15

## 2024-02-29 PROBLEM — K21.9 GASTRO-ESOPHAGEAL REFLUX DISEASE WITHOUT ESOPHAGITIS: Status: ACTIVE | Noted: 2024-02-29

## 2024-02-29 PROBLEM — Z98.890 HISTORY OF REPAIR OF HIP JOINT: Status: ACTIVE | Noted: 2024-02-29

## 2024-02-29 PROBLEM — G72.0 DRUG-INDUCED MYOPATHY: Status: ACTIVE | Noted: 2017-11-15

## 2024-02-29 PROBLEM — L60.3 DYSTROPHIA UNGUIUM: Status: ACTIVE | Noted: 2024-02-29

## 2024-02-29 PROBLEM — M70.61 TROCHANTERIC BURSITIS OF RIGHT HIP: Status: ACTIVE | Noted: 2018-07-20

## 2024-02-29 PROBLEM — M23.90 INTERNAL DERANGEMENT OF KNEE: Status: ACTIVE | Noted: 2024-02-29

## 2024-02-29 PROBLEM — M51.26 RECURRENT HERNIATION OF LUMBAR DISC: Status: ACTIVE | Noted: 2018-12-17

## 2024-02-29 PROBLEM — N20.0 KIDNEY STONES: Status: ACTIVE | Noted: 2024-02-29

## 2024-02-29 PROBLEM — Z79.82 LONG-TERM USE OF ASPIRIN THERAPY: Status: ACTIVE | Noted: 2017-11-15

## 2024-02-29 PROBLEM — M21.6X9 ACQUIRED CAVUS DEFORMITY OF FOOT: Status: ACTIVE | Noted: 2024-02-29

## 2024-02-29 PROBLEM — I49.3 PVC (PREMATURE VENTRICULAR CONTRACTION): Status: ACTIVE | Noted: 2017-07-07

## 2024-02-29 PROCEDURE — 99999 PR PBB SHADOW E&M-EST. PATIENT-LVL III: CPT | Mod: PBBFAC,,, | Performed by: NURSE PRACTITIONER

## 2024-02-29 PROCEDURE — 99213 OFFICE O/P EST LOW 20 MIN: CPT | Mod: PBBFAC,PO | Performed by: NURSE PRACTITIONER

## 2024-02-29 PROCEDURE — 99203 OFFICE O/P NEW LOW 30 MIN: CPT | Mod: S$PBB,,, | Performed by: NURSE PRACTITIONER

## 2024-02-29 RX ORDER — LANOLIN ALCOHOL/MO/W.PET/CERES
100 CREAM (GRAM) TOPICAL DAILY
COMMUNITY

## 2024-02-29 RX ORDER — POLYETHYLENE GLYCOL 3350 17 G/17G
17 POWDER, FOR SOLUTION ORAL DAILY
COMMUNITY

## 2024-02-29 RX ORDER — GABAPENTIN 300 MG/1
300 CAPSULE ORAL 3 TIMES DAILY
COMMUNITY

## 2024-02-29 NOTE — PROGRESS NOTES
Subjective:       Patient ID: Abdulaziz Khan is a 68 y.o. male Body mass index is 23.08 kg/m².    Chief Complaint: Colonoscopy    This patient is new to me.  Referring Provider: [facility] for anemia, due for repeat colonoscopy.     [facility], summarized throughout progress note.  Blood work reviewed-see records for full results. Did cologuard ~2 years ago and reports it was negative.    Anemia  Presents for initial (initial to me; seeing [facility]) visit. There has been no abdominal pain, fever or weight loss. Signs of blood loss that are not present include hematemesis, hematochezia and melena. Past treatments include oral vitamin B12. Past medical history includes clotting disorder (history of DVT in the past (after knee replacement)) and recent surgery (10/2023 had back fusion surgery). There is no history of cancer, chronic liver disease, chronic renal disease, heart failure, HIV/AIDS, inflammatory bowel disease, recent illness or recent trauma.     Review of Systems   Constitutional:  Positive for appetite change (decreased at times). Negative for chills, fatigue, fever and weight loss.        Oxycodone PRN- takes about once a week   HENT:  Negative for sore throat and trouble swallowing.    Respiratory:  Negative for cough, choking and shortness of breath.    Cardiovascular:  Negative for chest pain.        Reports had heart surgery 5/15/2023   Gastrointestinal:  Positive for constipation (relates to taking pain medication; bowel movements are currently once every 3 days with taking miralax PRN, lots of water). Negative for abdominal pain, anal bleeding, blood in stool, diarrhea, hematemesis, hematochezia, melena, nausea, rectal pain and vomiting.        History of GERD controlled with protonix 40 mg once daily   Genitourinary:  Negative for difficulty urinating, dysuria, flank pain and hematuria.   Neurological:  Negative for  weakness.       Past Medical History:   Diagnosis Date    Colon polyp     Diabetes mellitus, type 2     Gallstone     & sludge    Hepatic steatosis     Hepatomegaly     Pancreatic cyst     seeing Dr. Hernandez     Past Surgical History:   Procedure Laterality Date    CARDIAC SURGERY  06/2023    at [place]    CHOLECYSTECTOMY      COLONOSCOPY  12/2006    1 hyperplastic [facility]    ENDOSCOPIC ULTRASOUND OF UPPER GASTROINTESTINAL TRACT Left 11/23/2022    Procedure: ULTRASOUND, UPPER GI TRACT, ENDOSCOPIC;  Surgeon: Zain Hernandez MD;  Location: [facility];  Service: Endoscopy;  Laterality: Left;  GIF plus Linear    ESOPHAGOGASTRODUODENOSCOPY N/A 08/31/2022    Procedure: EGD (ESOPHAGOGASTRODUODENOSCOPY);  Surgeon: Zain Hernandez MD;  Location: [facility] ENDO;  Service: Endoscopy;  Laterality: N/A;    ESOPHAGOGASTRODUODENOSCOPY N/A 11/23/2022    Procedure: EGD (ESOPHAGOGASTRODUODENOSCOPY);  Surgeon: Zain Hernandez MD;  Location: [facility];  Service: Endoscopy;  Laterality: N/A;    HIP SURGERY Bilateral     done at [place]    JOINT REPLACEMENT      bilateral hip replacement    KNEE ARTHROSCOPY      KNEE SURGERY      right knee replacement x2,     UPPER GASTROINTESTINAL ENDOSCOPY       Family History   Problem Relation Age of Onset    Collagen disease Neg Hx     Colon cancer Neg Hx     Crohn's disease Neg Hx     Esophageal cancer Neg Hx     Ulcerative colitis Neg Hx     Stomach cancer Neg Hx      Social History     Tobacco Use    Smoking status: Never    Smokeless tobacco: Never   Substance Use Topics    Alcohol use: Yes     Alcohol/week: 2.0 standard drinks of alcohol     Types: 2 Cans of beer per week    Drug use: No     Wt Readings from Last 10 Encounters:   02/29/24 77.2 kg (170 lb 3.1 oz)   06/06/23 68.9 kg (152 lb)   04/04/23 77.6 kg (171 lb)   11/23/22 77.6 kg (171 lb 1.2 oz)   08/31/22 72.4 kg (159 lb 9.8 oz)   10/22/20 77.1 kg (169 lb 15.6 oz)   07/30/19 77.1 kg (170 lb)   11/25/15  80.6 kg (177 lb 9.3 oz)   10/28/15 72.6 kg (160 lb)   10/07/15 72.6 kg (160 lb)     Lab Results   Component Value Date    WBC 8.90 09/28/2010    HGB 8.5 (L) 10/24/2023    HCT 26.4 (L) 10/24/2023    MCV 94.1 09/28/2010     09/28/2010     CMP  Sodium   Date Value Ref Range Status   01/12/2024 137 136 - 144 mmol/L Final   09/28/2010 139 136 - 145 mMol/l Final     Potassium   Date Value Ref Range Status   01/12/2024 4.1 3.6 - 5.1 mmol/L Final   09/28/2010 4.2 3.5 - 5.1 mMol/l Final     Chloride   Date Value Ref Range Status   09/28/2010 103 95 - 110 mMol/l Final     CO2   Date Value Ref Range Status   01/12/2024 24 22 - 32 mmol/L Final   09/28/2010 24 23.0 - 29.0 mEq/L Final     Glucose   Date Value Ref Range Status   09/28/2010 102 70 - 110 mg/dl Final     BUN   Date Value Ref Range Status   09/28/2010 13 6 - 20 mg/dl Final     Blood Urea Nitrogen   Date Value Ref Range Status   01/12/2024 14 8 - 20 mg/dL Final     Creatinine   Date Value Ref Range Status   01/12/2024 0.88 (L) 0.90 - 1.30 mg/dL Final   09/28/2010 0.9 0.5 - 1.4 mg/dl Final     Calcium   Date Value Ref Range Status   01/12/2024 9.1 8.9 - 10.3 mg/dL Final   09/28/2010 9.2 8.7 - 10.5 mg/dl Final     Total Protein   Date Value Ref Range Status   01/12/2024 7.0 6.1 - 7.9 g/dL Final   09/28/2010 7.1 6.0 - 8.4 gm/dl Final     Albumin   Date Value Ref Range Status   01/12/2024 4.1 3.5 - 4.8 g/dL Final   09/28/2010 3.7 3.5 - 5.2 g/dl Final     Total Bilirubin   Date Value Ref Range Status   01/12/2024 0.6 0.4 - 2.0 mg/dL Final   09/28/2010 0.8 0.1 - 1.0 mg/dl Final     Comment:     For infants and newborns, interpretation of results should be based  on gestational age, weight and in agreement with clinical  observations.  .  Premature Infant recommended reference ranges:  Up to 24 hours.............<8.0 mg/dl  Up to 48 hours............<12.0 mg/dl  3-5 days..................<15.0 mg/dl  6-29 days.................<15.0 mg/dl     Alkaline Phosphatase   Date  Value Ref Range Status   01/12/2024 96 28 - 116 U/L Final   09/28/2010 74 55 - 135 U/L Final     AST   Date Value Ref Range Status   01/12/2024 13 12 - 40 U/L Final   09/28/2010 30 10 - 40 U/L Final     ALT   Date Value Ref Range Status   01/12/2024 9 5 - 56 U/L Final   09/28/2010 46 (H) 10 - 44 U/L Final     Anion Gap   Date Value Ref Range Status   01/12/2024 11 7 - 16 mmol/L Final   09/28/2010 17 10 - 20 mmol/L Final     eGFR if    Date Value Ref Range Status   09/28/2010 >60 >60 mL/min Final     Comment:     Estimated glomerular filtration rate (eGFR) is normalized to an  average body surface area of 1.73 square meters.  The calculation  used to obtain the eGFR is the adjusted MDRD equation, which factors  patient sex, age, race, and creatinine result.  Since race is unknown  in our information system, the eGFR values for -American  and Non--American patients are given for each creatinine  result.     eGFR if non    Date Value Ref Range Status   09/28/2010 >60 >60 mL/min Final     Reviewed prior medical records including radiology reports from care everywhere of 1/12/2024 CTA chest abdomen pelvis; 10/18/2022 gastric emptying studies; 10/13/2022 HIDA scan; 8/16/2022 Upper GI; 5/31/2022 abdominal ultrasound; 4/29/2022 MRI abdomen; & endoscopy history (see surgical history/procedures).    Objective:      Physical Exam  Vitals and nursing note reviewed.   Constitutional:       General: He is not in acute distress.     Appearance: Normal appearance. He is well-developed. He is not diaphoretic.   HENT:      Mouth/Throat:      Lips: Pink. No lesions.      Mouth: Mucous membranes are moist. No oral lesions.      Tongue: No lesions.      Pharynx: Oropharynx is clear. No pharyngeal swelling or posterior oropharyngeal erythema.   Eyes:      General: No scleral icterus.     Conjunctiva/sclera: Conjunctivae normal.   Pulmonary:      Effort: Pulmonary effort is normal. No  respiratory distress.      Breath sounds: Normal breath sounds. No wheezing.   Abdominal:      General: Bowel sounds are normal. There is no distension or abdominal bruit.      Palpations: Abdomen is soft. Abdomen is not rigid. There is no mass.      Tenderness: There is no abdominal tenderness. There is no guarding or rebound. Negative signs include Pandya's sign and McBurney's sign.   Skin:     General: Skin is warm and dry.      Coloration: Skin is not jaundiced or pale.      Findings: No erythema or rash.   Neurological:      Mental Status: He is alert and oriented to person, place, and time.   Psychiatric:         Behavior: Behavior normal.         Thought Content: Thought content normal.         Judgment: Judgment normal.         Assessment:       1. History of anemia    2. Pancreatic cyst    3. History of colon polyps    4. History of chronic constipation    5. Anticoagulant long-term use        Plan:       History of anemia  - schedule Colonoscopy, discussed procedure with the patient, including risks and benefits, patient verbalized understanding  - discussed with patient the different ways that anemia occurs: blood loss (such as from the gi tract), the body is not making enough, or the body is breaking down the rbcs too quickly; recommend colonoscopy to further evaluate gi tract for possible blood loss and pending results of endoscopies, possible UGI with Small Bowel Follow Through/video capsule study  -follow-up with PCP and/or hematology for continued evaluation and management    Pancreatic cyst  Recommend follow-up with Dr. Hernandez for continued evaluation and management.    History of colon polyps  - schedule Colonoscopy, discussed procedure with the patient, including risks and benefits, patient verbalized understanding    History of chronic constipation  - schedule Colonoscopy, discussed procedure with the patient, including risks and benefits, patient verbalized understanding  - discussed with  patient that a side effect of narcotic pain medications is constipation, advised patient to talk to provider who manages pain medication, patient verbalized understanding  - Recommend daily exercise as tolerated, adequate water intake (six 8-oz glasses of water daily), and high fiber diet. OTC fiber supplements are recommended if diet does not reach daily fiber goal (20-30 grams daily), such as Metamucil, Citrucel, or FiberCon (take as directed, separate from other oral medications by >2 hours).  -Recommend taking an OTC stool softener such as Colace as directed to avoid hard stools and straining with bowel movements PRN  - CONTINUE OTC MiraLax once daily (17g PO) as directed  -If still no improvement with these measures, call/follow-up    Anticoagulant long-term use  - informed patient that the anticoagulant(s) will likely need to be held for endoscopy, nurse will confirm with endoscopist, cardiologist, and/or PCP.    Follow up in about 1 month (around 3/29/2024), or if symptoms worsen or fail to improve.      If no improvement in symptoms or symptoms worsen, call/follow-up at clinic or go to ER.        30 minutes of total time spent on the encounter, which includes face to face time and non-face to face time preparing to see the patient (e.g., review of tests), Obtaining and/or reviewing separately obtained history, Documenting clinical information in the electronic or other health record, Independently interpreting results (not separately reported) and communicating results to the patient/family/caregiver, or Care coordination (not separately reported).

## 2024-02-29 NOTE — PATIENT INSTRUCTIONS
Anemia  Anemia is a condition that occurs when your body does not have enough healthy red blood cells (RBCs). RBCs are the parts of your blood that carry oxygen throughout your body. A protein called hemoglobin allows your RBCs to absorb and release oxygen. Without enough RBCs or hemoglobin, your body doesn't get enough oxygen. Symptoms of anemia may then occur.    What are the symptoms of anemia?  Some people with anemia have no symptoms. But most people have symptoms that range from mild to severe. These can include:  Tiredness (fatigue)  Weakness  Pale skin  Shortness of breath  Dizziness or fainting  Rapid heartbeat  Trouble doing normal amounts of activity  Jaundice (yellowing of your eyes, skin, or mouth; dark urine)  What causes anemia?  Anemia can occur when your body:  Loses too much blood  Does not make enough RBCs  Destroys your RBCs at a faster rate than it can replace them  Does not make a normal amount of hemoglobin in your RBCs  These problems can occur for many reasons, including:  A condition that you are born with (congenital or inherited), such as sickle cell disease or thalassemia  Heavy bleeding for any reason, including injury, surgery, childbirth, or even heavy menstrual periods  Being low in certain nutrients, such as iron, folate, or vitamin B12, possibly from a poor diet or a condition like celiac disease or Crohn's disease  Certain chronic conditions like diabetes, arthritis, or kidney disease  Certain chronic infections like tuberculosis or HIV  Exposure to certain medicines, such as those used for chemotherapy  There are different types of anemia. Your healthcare provider can tell you more about the type of anemia you have and what may have caused it.  How is anemia diagnosed?  To diagnose anemia, your healthcare provider orders blood tests. These can include:  Complete blood cell count (CBC). This test measures the amounts of the different types of blood cells.  Blood smear. This test  checks the size and shape of your blood cells. To do the test, a drop of your blood is viewed under a microscope. A stain is used to make the blood cells easier to see.  Iron studies. These tests measure the amount of iron in your blood. Your body needs iron to make hemoglobin in your RBCs.  Vitamin B12 and folate studies. These tests check for some of the components that help give RBCs a normal size and shape.  Reticulocyte count. This test measures the amount of new RBCs that your bone marrow makes.  Hemoglobin electrophoresis. This test checks for problems with your hemoglobin in RBCs.  How is anemia treated?  Treatment for anemia is based on the type of anemia, its cause, and the severity of your symptoms. Treatments may include:  Diet changes. This involves increasing the amount of certain nutrients in your diet, such as iron, vitamin B12, or folate. Your healthcare provider may also prescribe nutrient supplements.  Medicines. Certain medicines treat the cause of your anemia. Others help build new RBCs or relieve symptoms. If a medicine is the cause of your anemia, you may need to stop or change it.  Blood transfusions. Replacing some of your blood can increase the number of healthy RBCs in your body.  Surgery. In some cases, your doctor may do surgery to treat the underlying cause of anemia. If you need surgery, your healthcare provider will explain the procedure and outline the risks and benefits for you.  What are the long-term concerns?  If you have a certain type of anemia, you can expect a full recovery after treatment. If you have other types of anemia (especially a type you're born with), you will need to manage it for life. Your doctor can tell you more.  Date Last Reviewed: 12/1/2016  © 9787-2437 [employer]. [address] [place] [address], [place] [address]. All rights reserved. This information is not intended as a substitute for professional medical care. Always follow your healthcare  professional's instructions.

## 2024-04-26 ENCOUNTER — TELEPHONE (OUTPATIENT)
Dept: GASTROENTEROLOGY | Facility: CLINIC | Age: 69
End: 2024-04-26
Payer: OTHER GOVERNMENT

## 2024-04-26 NOTE — TELEPHONE ENCOUNTER
Called and spoke with the patient, patient requested to reschedule his procedure, procedure rescheduled with the patient, patient verbalized understanding of this.

## 2024-07-02 ENCOUNTER — TELEPHONE (OUTPATIENT)
Dept: GASTROENTEROLOGY | Facility: CLINIC | Age: 69
End: 2024-07-02
Payer: OTHER GOVERNMENT

## 2024-07-02 RX ORDER — ALOGLIPTIN 25 MG/1
TABLET, FILM COATED ORAL
COMMUNITY

## 2024-07-02 NOTE — TELEPHONE ENCOUNTER
----- Message from Ermelinda Logan sent at 7/2/2024  9:32 AM CDT -----  Regarding: Colonoscopy Questions  Type:  Needs Medical Advice    Who Called: Pt     Would the patient rather a call back or a response via [facility]? Call Back    Best Call Back Number: [phone]      Additional Information: Is scheduled for a Colonoscopy tomorrow and would like someone to give him a call back to answer a few questions         Have a great day. Thank you!

## 2024-07-03 ENCOUNTER — HOSPITAL ENCOUNTER (OUTPATIENT)
Facility: HOSPITAL | Age: 69
Discharge: HOME | End: 2024-07-03
Attending: INTERNAL MEDICINE | Admitting: INTERNAL MEDICINE
Payer: OTHER GOVERNMENT

## 2024-07-03 ENCOUNTER — ANESTHESIA EVENT (OUTPATIENT)
Dept: ENDOSCOPY | Facility: HOSPITAL | Age: 69
End: 2024-07-03
Payer: OTHER GOVERNMENT

## 2024-07-03 ENCOUNTER — ANESTHESIA (OUTPATIENT)
Dept: ENDOSCOPY | Facility: HOSPITAL | Age: 69
End: 2024-07-03
Payer: OTHER GOVERNMENT

## 2024-07-03 VITALS
RESPIRATION RATE: 16 BRPM | OXYGEN SATURATION: 99 % | DIASTOLIC BLOOD PRESSURE: 64 MMHG | SYSTOLIC BLOOD PRESSURE: 130 MMHG | HEART RATE: 84 BPM | HEIGHT: 72 IN | TEMPERATURE: 98 F | BODY MASS INDEX: 22.35 KG/M2 | WEIGHT: 165 LBS

## 2024-07-03 DIAGNOSIS — Z86.010 HX OF COLONIC POLYPS: ICD-10-CM

## 2024-07-03 LAB — GLUCOSE SERPL-MCNC: 181 MG/DL (ref 70–110)

## 2024-07-03 PROCEDURE — 37000009 HC ANESTHESIA EA ADD 15 MINS: Mod: PO | Performed by: INTERNAL MEDICINE

## 2024-07-03 PROCEDURE — 88305 TISSUE EXAM BY PATHOLOGIST: CPT | Mod: PO | Performed by: STUDENT IN AN ORGANIZED HEALTH CARE EDUCATION/TRAINING PROGRAM

## 2024-07-03 PROCEDURE — 63600175 PHARM REV CODE 636 W HCPCS: Mod: PO | Performed by: INTERNAL MEDICINE

## 2024-07-03 PROCEDURE — 37000008 HC ANESTHESIA 1ST 15 MINUTES: Mod: PO | Performed by: INTERNAL MEDICINE

## 2024-07-03 PROCEDURE — 45385 COLONOSCOPY W/LESION REMOVAL: CPT | Mod: 33,,, | Performed by: INTERNAL MEDICINE

## 2024-07-03 PROCEDURE — 25000003 PHARM REV CODE 250: Mod: PO | Performed by: NURSE ANESTHETIST, CERTIFIED REGISTERED

## 2024-07-03 PROCEDURE — 45385 COLONOSCOPY W/LESION REMOVAL: CPT | Mod: PT,PO | Performed by: INTERNAL MEDICINE

## 2024-07-03 PROCEDURE — 27201089 HC SNARE, DISP (ANY): Mod: PO | Performed by: INTERNAL MEDICINE

## 2024-07-03 PROCEDURE — 63600175 PHARM REV CODE 636 W HCPCS: Mod: PO | Performed by: NURSE ANESTHETIST, CERTIFIED REGISTERED

## 2024-07-03 RX ORDER — LIDOCAINE HYDROCHLORIDE 20 MG/ML
INJECTION INTRAVENOUS
Status: DISCONTINUED | OUTPATIENT
Start: 2024-07-03 | End: 2024-07-03

## 2024-07-03 RX ORDER — SODIUM CHLORIDE 0.9 % (FLUSH) 0.9 %
10 SYRINGE (ML) INJECTION
Status: DISCONTINUED | OUTPATIENT
Start: 2024-07-03 | End: 2024-07-03 | Stop reason: HOSPADM

## 2024-07-03 RX ORDER — PROPOFOL 10 MG/ML
VIAL (ML) INTRAVENOUS
Status: DISCONTINUED | OUTPATIENT
Start: 2024-07-03 | End: 2024-07-03

## 2024-07-03 RX ORDER — SODIUM CHLORIDE, SODIUM LACTATE, POTASSIUM CHLORIDE, CALCIUM CHLORIDE 600; 310; 30; 20 MG/100ML; MG/100ML; MG/100ML; MG/100ML
INJECTION, SOLUTION INTRAVENOUS CONTINUOUS
Status: DISCONTINUED | OUTPATIENT
Start: 2024-07-03 | End: 2024-07-03 | Stop reason: HOSPADM

## 2024-07-03 RX ADMIN — PROPOFOL 50 MG: 10 INJECTION, EMULSION INTRAVENOUS at 09:07

## 2024-07-03 RX ADMIN — PROPOFOL 120 MG: 10 INJECTION, EMULSION INTRAVENOUS at 09:07

## 2024-07-03 RX ADMIN — PROPOFOL 40 MG: 10 INJECTION, EMULSION INTRAVENOUS at 09:07

## 2024-07-03 RX ADMIN — LIDOCAINE HYDROCHLORIDE 75 MG: 20 INJECTION INTRAVENOUS at 09:07

## 2024-07-03 RX ADMIN — SODIUM CHLORIDE, POTASSIUM CHLORIDE, SODIUM LACTATE AND CALCIUM CHLORIDE: 600; 310; 30; 20 INJECTION, SOLUTION INTRAVENOUS at 08:07

## 2024-07-03 NOTE — H&P
History & Physical - Short Stay  Gastroenterology      SUBJECTIVE:     Procedure: Colonoscopy    Chief Complaint/Indication for Procedure: Change in Bowel Habits and Previous Polyps    PTA Medications   Medication Sig    alogliptin (NESINA) 25 mg Tab Take by mouth.    atorvastatin (LIPITOR) 40 MG tablet Take 1 tablet (40 mg total) by mouth daily    cyanocobalamin (VITAMIN B-12) 1000 MCG tablet Take 100 mcg by mouth once daily.    flash glucose scanning reader (FREESTYLE NATALIIA 2 READER) Misc 1 each by Other route.    flash glucose sensor (FREESTYLE NATALIIA 2 SENSOR) Kit 1 each by Other route.    gabapentin (NEURONTIN) 300 MG capsule Take 300 mg by mouth 3 (three) times daily.    oxyCODONE (ROXICODONE) 15 MG Tab TAKE ONE TABLET BY MOUTH EVERY TWELVE HOURS AS NEEDED FOR PAIN MAY CAUSE DROWSINESS CAN FILL 10-7-20    pantoprazole (PROTONIX) 40 MG tablet Take 40 mg by mouth once daily.    rosuvastatin (CRESTOR) 40 MG Tab 40 mg.    sacubitriL-valsartan (ENTRESTO) 24-26 mg per tablet TAKE ONE 24/26MG TABLET BY MOUTH TWICE A DAY FOR CHRONIC HEART FAILURE    polyethylene glycol (GLYCOLAX) 17 gram/dose powder Take 17 g by mouth once daily.       Review of patient's allergies indicates:  No Known Allergies     Past Medical History:   Diagnosis Date    Anemia     Carpal tunnel syndrome, left     Cervical radiculopathy     Chronic Heart Failure     Chronic low back pain     Colon polyp     DDD (degenerative disc disease), lumbar     Diabetes mellitus, type 2     Foot drop, right     Gallstone     & sludge    GERD (gastroesophageal reflux disease)     Hepatic steatosis     Hepatomegaly     History of DVT 10/29/2013    s/p R knee sx    HNP (herniated nucleus pulposus), lumbar     Hyperlipidemia     Hypertension     Lumbar pseudoarthrosis     Lumbar radiculopathy     Neuropathy     Osteoarthritis     Pancreatic cyst     seeing Dr. Hernandez    PVC (premature ventricular contraction)     Recurrent herniation of lumbar disc     TIA  (transient ischemic attack) 2010     Past Surgical History:   Procedure Laterality Date    ARTHROPLASTY Right 06/23/2014    Patella    BONE GRAFT      Graft from LEFT hip to RIGHT foot    CARPAL TUNNEL RELEASE Left     CATARACT EXTRACTION Bilateral     CHOLECYSTECTOMY  01/03/2023    COL[facility]OSCOPY  [facility]    [facility]    CORONARY ARTERY BYPASS GRAFT  05/2023    x3 vessels, [place]    ELBOW SURGERY Bilateral     ENDOSCOPIC ULTRASOUND OF UPPER GASTROINTESTINAL TRACT Left 11/23/2022    Procedure: ULTRASOUND, UPPER GI TRACT, ENDOSCOPIC;  Surgeon: Zain Hernandez MD;  Location: [facility] ENDO;  Service: Endoscopy;  Laterality: Left;  GIF plus Linear    ESOPHAGOGASTRODUODENOSCOPY N/A 08/31/2022    Procedure: EGD (ESOPHAGOGASTRODUODENOSCOPY);  Surgeon: Zain Hernandez MD;  Location: [facility] ENDO;  Service: Endoscopy;  Laterality: N/A;    ESOPHAGOGASTRODUODENOSCOPY N/A 11/23/2022    Procedure: EGD (ESOPHAGOGASTRODUODENOSCOPY);  Surgeon: Zain Hernandez MD;  Location: [facility] ENDO;  Service: Endoscopy;  Laterality: N/A;    CIRO FILTER PLACEMENT  2015    HIP FUSION Right 04/2018    INGUINAL HERNIA REPAIR  1967    JOINT REPLACEMENT      bilateral hip replacement    KNEE ARTHROPLASTY Right 02/01/2016    Revision    KNEE ARTHROSCOPY Right 07/22/2013    KNEE ARTHROSCOPY Right 10/06/2014    KNEE SURGERY Right 08/01/2013    ARTHROCENTESIS    LUMBAR EPIDURAL INJECTION      LUMBAR FUSION  06/09/2022    WINTER L3-L5,PLIF L5-S1 with Possible Direct Decompression L3-S1    LUMBAR FUSION  10/2023    L3-S1, Revision d/t displaced screws    MICRODISCECTOMY OF SPINE  12/01/2016    LUMBAR    MICRODISCECTOMY OF SPINE  12/27/2018    LUMBAR    NASAL SEPTUM SURGERY  1979    Deviated septum    ROTATOR CUFF REPAIR Right 2022    SCAPHOID FRACTURE SURGERY      Navicular Fusion    SI JOINT FUSION Right 04/05/2018    TONSILLECTOMY      TOTAL HIP ARTHROPLASTY Right 1993    TOTAL HIP ARTHROPLASTY Left 1992    UPPER  GASTROINTESTINAL ENDOSCOPY       Family History   Problem Relation Name Age of Onset    Heart disease Mother      Arthritis Mother      Stroke Father      Heart disease Father      Heart attack Father      Heart disease Brother      Leukemia Brother      Collagen disease Neg Hx      Colon cancer Neg Hx      Crohn's disease Neg Hx      Esophageal cancer Neg Hx      Ulcerative colitis Neg Hx      Stomach cancer Neg Hx       Social History     Tobacco Use    Smoking status: Never    Smokeless tobacco: Never   Substance Use Topics    Alcohol use: Yes     Alcohol/week: 2.0 standard drinks of alcohol     Types: 2 Cans of beer per week    Drug use: No         OBJECTIVE:     Vital Signs (Most Recent)  Temp: 98.9 °F (37.2 °C) (07/03/24 0827)  Pulse: 89 (07/03/24 0827)  Resp: 12 (07/03/24 0827)  BP: 135/61 (07/03/24 0827)  SpO2: 99 % (07/03/24 0827)    Physical Exam:                                                       GENERAL:  Comfortable, in no acute distress.                                 HEENT EXAM:  Nonicteric.  No adenopathy.  Oropharynx is clear.               NECK:  Supple.                                                               LUNGS:  Clear.                                                               CARDIAC:  Regular rate and rhythm.  S1, S2.  No murmur.                      ABDOMEN:  Soft, positive bowel sounds, nontender.  No hepatosplenomegaly or masses.  No rebound or guarding.                                             EXTREMITIES:  No edema.     MENTAL STATUS:  Normal, alert and oriented.      ASSESSMENT/PLAN:     Assessment: Change in Bowel Habits and Previous Polyps    Plan: Colonoscopy    Anesthesia Plan: General    ASA Grade: ASA 2 - Patient with mild systemic disease with no functional limitations    MALLAMPATI SCORE:  I (soft palate, uvula, fauces, and tonsillar pillars visible)

## 2024-07-03 NOTE — ANESTHESIA POSTPROCEDURE EVALUATION
Anesthesia Post Evaluation    Patient: Abdulaziz Khan    Procedure(s) Performed: Procedure(s) (LRB):  COLONOSCOPY (N/A)    Final Anesthesia Type: general      Patient location during evaluation: PACU  Patient participation: Yes- Able to Participate  Level of consciousness: awake and alert and oriented  Post-procedure vital signs: reviewed and stable  Pain management: adequate  Airway patency: patent    PONV status at discharge: No PONV  Anesthetic complications: no      Cardiovascular status: blood pressure returned to baseline and stable  Respiratory status: unassisted and spontaneous ventilation  Hydration status: euvolemic  Follow-up not needed.              Vitals Value Taken Time   /64 07/03/24 0955   Temp 36.4 °C (97.5 °F) 07/03/24 0928   Pulse 84 07/03/24 0955   Resp 16 07/03/24 0955   SpO2 99 % 07/03/24 0955         Event Time   Out of Recovery 10:02:57         Pain/Lelo Score: Lelo Score: 9 (7/3/2024  9:28 AM)

## 2024-07-03 NOTE — PROVATION PATIENT INSTRUCTIONS
Discharge Summary/Instructions after an Endoscopic Procedure  Patient Name: Abdulaziz Khan  Patient MRN: [id number]  Patient YOB: 1955  Wednesday, July 3, 2024  Mikal Page MD  Dear patient,  As a result of recent federal legislation (The Federal Cures Act), you may   receive lab or pathology results from your procedure in your [facility]   account before your physician is able to contact you. Your physician or   their representative will relay the results to you with their   recommendations at their soonest availability.  Thank you,  RESTRICTIONS:  During your procedure today, you received medications for sedation.  These   medications may affect your judgment, balance and coordination.  Therefore,   for 24 hours, you have the following restrictions:   - DO NOT drive a car, operate machinery, make legal/financial decisions,   sign important papers or drink alcohol.    ACTIVITY:  Today: no heavy lifting, straining or running due to procedural   sedation/anesthesia.  The following day: return to full activity including work.  DIET:  Eat and drink normally unless instructed otherwise.     TREATMENT FOR COMMON SIDE EFFECTS:  - Mild abdominal pain, nausea, belching, bloating or excessive gas:  rest,   eat lightly and use a heating pad.  - Sore Throat: treat with throat lozenges and/or gargle with warm salt   water.  - Because air was used during the procedure, expelling large amounts of air   from your rectum or belching is normal.  - If a bowel prep was taken, you may not have a bowel movement for 1-3 days.    This is normal.  SYMPTOMS TO WATCH FOR AND REPORT TO YOUR PHYSICIAN:  1. Abdominal pain or bloating, other than gas cramps.  2. Chest pain.  3. Back pain.  4. Signs of infection such as: chills or fever occurring within 24 hours   after the procedure.  5. Rectal bleeding, which would show as bright red, maroon, or black stools.   (A tablespoon of blood from the rectum is not serious, especially  if   hemorrhoids are present.)  6. Vomiting.  7. Weakness or dizziness.  GO DIRECTLY TO THE NEAREST EMERGENCY ROOM IF YOU HAVE ANY OF THE FOLLOWING:      Difficulty breathing              Chills and/or fever over 101 F   Persistent vomiting and/or vomiting blood   Severe abdominal pain   Severe chest pain   Black, tarry stools   Bleeding- more than one tablespoon   Any other symptom or condition that you feel may need urgent attention  Your doctor recommends these additional instructions:  If any biopsies were taken, your doctors clinic will contact you in 1 to 2   weeks with any results.  We are waiting for your pathology results.   Your physician has recommended a repeat colonoscopy in five years for   surveillance based on pathology results.   You are being discharged to home.  For questions, problems or results please call your physician - Mikal Page MD at Work:  [phone].  EMERGENCY PHONE NUMBER: [phone], LAB RESULTS: [phone]  IF A COMPLICATION OR EMERGENCY SITUATION ARISES AND YOU ARE UNABLE TO REACH   YOUR PHYSICIAN - GO DIRECTLY TO THE EMERGENCY ROOM.  ___________________________________________  Nurse Signature  ___________________________________________  Patient/Designated Responsible Party Signature  Mikal Page MD  7/3/2024 9:29:03 AM  This report has been verified and signed electronically.  Dear patient,  As a result of recent federal legislation (The Federal Cures Act), you may   receive lab or pathology results from your procedure in your [facility]   account before your physician is able to contact you. Your physician or   their representative will relay the results to you with their   recommendations at their soonest availability.  Thank you.  PROVATION

## 2024-07-03 NOTE — DISCHARGE SUMMARY
[place] - Endoscopy  Discharge Note  Short Stay  Discharge Note  Short Stay      SUMMARY     Admit Date: 7/3/2024    Attending Physician: Mikal Page MD     Discharge Physician: Mikal Page MD    Discharge Date: 7/3/2024 9:29 AM    Final Diagnosis: Anemia, unspecified type [D64.9]  History of chronic constipation [Z87.19]  History of colon polyps [Z86.010]    Disposition: HOME OR SELF CARE    Patient Instructions:   Current Discharge Medication List        CONTINUE these medications which have NOT CHANGED    Details   alogliptin (NESINA) 25 mg Tab Take by mouth.      atorvastatin (LIPITOR) 40 MG tablet Take 1 tablet (40 mg total) by mouth daily      cyanocobalamin (VITAMIN B-12) 1000 MCG tablet Take 100 mcg by mouth once daily.      flash glucose scanning reader (FREESTYLE NATALIIA 2 READER) Misc 1 each by Other route.      flash glucose sensor (FREESTYLE NATALIIA 2 SENSOR) Kit 1 each by Other route.      gabapentin (NEURONTIN) 300 MG capsule Take 300 mg by mouth 3 (three) times daily.      oxyCODONE (ROXICODONE) 15 MG Tab TAKE ONE TABLET BY MOUTH EVERY TWELVE HOURS AS NEEDED FOR PAIN MAY CAUSE DROWSINESS CAN FILL 10-7-20    Comments: Quantity prescribed more than 7 day supply? Press F2 and select one:[id number]        pantoprazole (PROTONIX) 40 MG tablet Take 40 mg by mouth once daily.      rosuvastatin (CRESTOR) 40 MG Tab 40 mg.      sacubitriL-valsartan (ENTRESTO) 24-26 mg per tablet TAKE ONE 24/26MG TABLET BY MOUTH TWICE A DAY FOR CHRONIC HEART FAILURE      polyethylene glycol (GLYCOLAX) 17 gram/dose powder Take 17 g by mouth once daily.             Discharge Procedure Orders (must include Diet, Follow-up, Activity)    Follow Up:  Follow up with PCP as previously scheduled  Resume routine diet.  Activity as tolerated.    No driving day of procedure.

## 2024-07-03 NOTE — TRANSFER OF CARE
Anesthesia Transfer of Care Note    Patient: Abdulaziz Khan    Procedure(s) Performed: Procedure(s) (LRB):  COLONOSCOPY (N/A)    Patient location: PACU    Anesthesia Type: general    Transport from OR: Transported from OR on room air with adequate spontaneous ventilation    Post pain: adequate analgesia    Post assessment: no apparent anesthetic complications and tolerated procedure well    Post vital signs: stable    Level of consciousness: awake    Nausea/Vomiting: no nausea/vomiting    Complications: none    Transfer of care protocol was followed      Last vitals: Visit Vitals  /61 (BP Location: Right arm, Patient Position: Lying)   Pulse 89   Temp 37.2 °C (98.9 °F) (Skin)   Resp 12   Ht 6' (1.829 m)   Wt 74.8 kg (165 lb)   SpO2 99%   BMI 22.38 kg/m²

## 2024-07-03 NOTE — ANESTHESIA PREPROCEDURE EVALUATION
07/03/2024  Abdulaziz Khan is a 68 y.o., male.      Pre-op Assessment    I have reviewed the Patient Summary Reports.     I have reviewed the Nursing Notes. I have reviewed the NPO Status.   I have reviewed the Medications.     Review of Systems  Anesthesia Hx:  No problems with previous Anesthesia                Social:  Non-Smoker       Cardiovascular:     Hypertension, well controlled   CAD  asymptomatic  Dysrhythmias (PVCs)  Angina CHF    hyperlipidemia                             Pulmonary:    Asthma asymptomatic                   Renal/:  Chronic Renal Disease                Hepatic/GI:     GERD Liver Disease,            Musculoskeletal:  Arthritis          Spine Disorders: cervical and lumbar Disc disease and Degenerative disease           Neurological:  TIA,  Neuromuscular Disease,                                   Endocrine:  Diabetes, well controlled, type 2               Physical Exam  General: Well nourished, Cooperative, Alert and Oriented    Airway:  Mallampati: II   Mouth Opening: Normal  TM Distance: Normal  Neck ROM: Normal ROM    Anesthesia Plan  Type of Anesthesia, risks & benefits discussed:    Anesthesia Type: Gen ETT, Gen Supraglottic Airway, Gen Natural Airway, MAC  Intra-op Monitoring Plan: Standard ASA Monitors  Post Op Pain Control Plan: multimodal analgesia  Induction:  IV  Airway Plan: Direct, Video and Fiberoptic, Post-Induction  Informed Consent: Informed consent signed with the Patient and all parties understand the risks and agree with anesthesia plan.  All questions answered.   ASA Score: 3    Ready For Surgery From Anesthesia Perspective.   .

## 2024-07-08 LAB
FINAL PATHOLOGIC DIAGNOSIS: NORMAL
GROSS: NORMAL
Lab: NORMAL

## 2024-11-12 ENCOUNTER — OFFICE VISIT (OUTPATIENT)
Dept: ORTHOPEDICS | Facility: CLINIC | Age: 69
End: 2024-11-12
Payer: MEDICARE

## 2024-11-12 VITALS — WEIGHT: 164.88 LBS | HEIGHT: 72 IN | BODY MASS INDEX: 22.33 KG/M2

## 2024-11-12 DIAGNOSIS — M25.551 BILATERAL HIP PAIN: Primary | ICD-10-CM

## 2024-11-12 DIAGNOSIS — Z96.643 HX OF BILATERAL HIP REPLACEMENTS: ICD-10-CM

## 2024-11-12 DIAGNOSIS — M25.552 BILATERAL HIP PAIN: Primary | ICD-10-CM

## 2024-11-12 PROCEDURE — 99999 PR PBB SHADOW E&M-EST. PATIENT-LVL III: CPT | Mod: PBBFAC,,, | Performed by: NURSE PRACTITIONER

## 2024-11-12 PROCEDURE — 99214 OFFICE O/P EST MOD 30 MIN: CPT | Mod: S$PBB,,, | Performed by: NURSE PRACTITIONER

## 2024-11-12 PROCEDURE — 99213 OFFICE O/P EST LOW 20 MIN: CPT | Mod: PBBFAC,PO | Performed by: NURSE PRACTITIONER

## 2024-11-12 NOTE — PROGRESS NOTES
"Chief Complaint   Patient presents with    Right Hip - Pain    Left Hip - Pain       History of Present Illness    Abdulaziz presents with concerns about bilateral hip replacements, focusing on the right hip. He had the left hip replacement 32 years ago and the right hip replacement 31 years ago. Recently, he has been experiencing pain in the right hip, specifically in the groin area. Abdulaziz reports, "The right side is causing issues. I'm experiencing pain in an unusual location." He also mentions difficulty putting on socks and shoes. The onset of this pain appears to be within the last month, as he notes, "The pain began approximately one month ago."    Abdulaziz has a history of multiple orthopedic surgeries, including bilateral knee replacements, back fusion procedures, and SI joint fusion with screws. Recently, he had a screw removed from the SI joint due to nerve impingement in the leg. He previously saw Dr. Trevizo, who had fused the SI joint and placed screws.    Abdulaziz attempted to schedule an appointment with a specialist, initially getting a date of January 9th, which he felt was too long to wait given the current symptoms. His condition appears to have progressed beyond bursitis, as noted by both the patient and the practitioner.    Abdulaziz denies any recent trauma or injury to the hip area.      ROS:  Musculoskeletal: +joint pain, -muscle pain          Past Medical History:   Diagnosis Date    Anemia     Carpal tunnel syndrome, left     Cervical radiculopathy     Chronic Heart Failure     Chronic low back pain     Colon polyp     DDD (degenerative disc disease), lumbar     Diabetes mellitus, type 2     Foot drop, right     Gallstone     & sludge    GERD (gastroesophageal reflux disease)     Hepatic steatosis     Hepatomegaly     History of DVT 10/29/2013    s/p R knee sx    HNP (herniated nucleus pulposus), lumbar     Hyperlipidemia     Hypertension     Lumbar pseudoarthrosis     Lumbar radiculopathy     " Neuropathy     Osteoarthritis     Pancreatic cyst     seeing Dr. Hernandez    PVC (premature ventricular contraction)     Recurrent herniation of lumbar disc     TIA (transient ischemic attack) 2010     Past Surgical History:   Procedure Laterality Date    ARTHROPLASTY Right 06/23/2014    Patella    BONE GRAFT      Graft from LEFT hip to RIGHT foot    CARPAL TUNNEL RELEASE Left     CATARACT EXTRACTION Bilateral     CHOLECYSTECTOMY  01/03/2023    COLONOSCOPY  2007    [facility] [facility]    COLONOSCOPY N/A 7/3/2024    Procedure: COLONOSCOPY;  Surgeon: Mikal Page MD;  Location: [facility] ENDO;  Service: Gastroenterology;  Laterality: N/A;    CORONARY ARTERY BYPASS GRAFT  05/2023    x3 vessels, [place]    ELBOW SURGERY Bilateral     ENDOSCOPIC ULTRASOUND OF UPPER GASTROINTESTINAL TRACT Left 11/23/2022    Procedure: ULTRASOUND, UPPER GI TRACT, ENDOSCOPIC;  Surgeon: Zain Hernandez MD;  Location: [facility];  Service: Endoscopy;  Laterality: Left;  GIF plus Linear    ESOPHAGOGASTRODUODENOSCOPY N/A 08/31/2022    Procedure: EGD (ESOPHAGOGASTRODUODENOSCOPY);  Surgeon: Zain Hernandez MD;  Location: [facility];  Service: Endoscopy;  Laterality: N/A;    ESOPHAGOGASTRODUODENOSCOPY N/A 11/23/2022    Procedure: EGD (ESOPHAGOGASTRODUODENOSCOPY);  Surgeon: Zain Hernandez MD;  Location: [facility];  Service: Endoscopy;  Laterality: N/A;    CIRO FILTER PLACEMENT  2015    HIP FUSION Right 04/2018    INGUINAL HERNIA REPAIR  1967    JOINT REPLACEMENT      bilateral hip replacement    KNEE ARTHROPLASTY Right 02/01/2016    Revision    KNEE ARTHROSCOPY Right 07/22/2013    KNEE ARTHROSCOPY Right 10/06/2014    KNEE SURGERY Right 08/01/2013    ARTHROCENTESIS    LUMBAR EPIDURAL INJECTION      LUMBAR FUSION  06/09/2022    WINTER L3-L5,PLIF L5-S1 with Possible Direct Decompression L3-S1    LUMBAR FUSION  10/2023    L3-S1, Revision d/t displaced screws    MICRODISCECTOMY OF SPINE  12/01/2016    LUMBAR     MICRODISCECTOMY OF SPINE  12/27/2018    LUMBAR    NASAL SEPTUM SURGERY  1979    Deviated septum    ROTATOR CUFF REPAIR Right 2022    SCAPHOID FRACTURE SURGERY      Navicular Fusion    SI JOINT FUSION Right 04/05/2018    TONSILLECTOMY      TOTAL HIP ARTHROPLASTY Right 1993    TOTAL HIP ARTHROPLASTY Left 1992    UPPER GASTROINTESTINAL ENDOSCOPY       Current Outpatient Medications on File Prior to Visit   Medication Sig Dispense Refill    alogliptin (NESINA) 25 mg Tab Take by mouth.      atorvastatin (LIPITOR) 40 MG tablet Take 1 tablet (40 mg total) by mouth daily      cyanocobalamin (VITAMIN B-12) 1000 MCG tablet Take 100 mcg by mouth once daily.      flash glucose scanning reader (FREESTYLE NATALIIA 2 READER) Misc 1 each by Other route.      flash glucose sensor (FREESTYLE NATALIIA 2 SENSOR) Kit 1 each by Other route.      gabapentin (NEURONTIN) 300 MG capsule Take 300 mg by mouth 3 (three) times daily.      oxyCODONE (ROXICODONE) 15 MG Tab TAKE ONE TABLET BY MOUTH EVERY TWELVE HOURS AS NEEDED FOR PAIN MAY CAUSE DROWSINESS CAN FILL 10-7-20      pantoprazole (PROTONIX) 40 MG tablet Take 40 mg by mouth once daily.      polyethylene glycol (GLYCOLAX) 17 gram/dose powder Take 17 g by mouth once daily.      rosuvastatin (CRESTOR) 40 MG Tab 40 mg.      sacubitriL-valsartan (ENTRESTO) 24-26 mg per tablet TAKE ONE 24/26MG TABLET BY MOUTH TWICE A DAY FOR CHRONIC HEART FAILURE       No current facility-administered medications on file prior to visit.     Review of patient's allergies indicates:  No Known Allergies  Family History   Problem Relation Name Age of Onset    Heart disease Mother      Arthritis Mother      Stroke Father      Heart disease Father      Heart attack Father      Heart disease Brother      Leukemia Brother      Collagen disease Neg Hx      Colon cancer Neg Hx      Crohn's disease Neg Hx      Esophageal cancer Neg Hx      Ulcerative colitis Neg Hx      Stomach cancer Neg Hx       Social History      Socioeconomic History    Marital status:    Tobacco Use    Smoking status: Never    Smokeless tobacco: Never   Substance and Sexual Activity    Alcohol use: Yes     Alcohol/week: 2.0 standard drinks of alcohol     Types: 2 Cans of beer per week    Drug use: No    Sexual activity: Yes     Partners: Female     Social Drivers of Health     Financial Resource Strain: Low Risk  (11/12/2024)    Overall Financial Resource Strain (CARDIA)     Difficulty of Paying Living Expenses: Not hard at all   Food Insecurity: No Food Insecurity (11/12/2024)    Hunger Vital Sign     Worried About Running Out of Food in the Last Year: Never true     Ran Out of Food in the Last Year: Never true   Transportation Needs: No Transportation Needs (10/15/2024)    [facility]    PRAPARE - Transportation     Lack of Transportation (Medical): No     Lack of Transportation (Non-Medical): No   Physical Activity: Insufficiently Active (11/12/2024)    Exercise Vital Sign     Days of Exercise per Week: 2 days     Minutes of Exercise per Session: 10 min   Stress: No Stress Concern Present (11/12/2024)    [employer] - Occupational Stress Questionnaire     Feeling of Stress : Only a little   Housing Stability: High Risk (11/12/2024)    Housing Stability Vital Sign     Unable to Pay for Housing in the Last Year: Yes       Review of Systems:  Constitutional:  Denies fever or chills   Eyes:  Denies change in visual acuity   HENT:  Denies nasal congestion or sore throat   Respiratory:  Denies cough or shortness of breath   Cardiovascular:  Denies chest pain or edema   GI:  Denies abdominal pain, nausea, vomiting, bloody stools or diarrhea   :  Denies dysuria   Integument:  Denies rash   Neurologic:  Denies headache, focal weakness or sensory changes   Endocrine:  Denies polyuria or polydipsia   Lymphatic:  Denies swollen glands   Psychiatric:  Denies depression or anxiety     Physical Exam:    Constitutional:  Well developed, well nourished, no acute distress, non-toxic appearance   Integument:  Well hydrated  Neurologic:  Alert & oriented x 3  Psychiatric:  Speech and behavior appropriate     Bilateral Hip Exam   bilateral Hip Exam   Tenderness   The patient is experiencing tenderness in the groin.   Range of Motion   The patient has decreased internal rotation bilateral hip.    Muscle Strength   Flexion: 4/5     Other   Erythema: absent  Sensation: normal  Pulse: present    bilateral Hip Exam   Hip exam performed same as contralateral side and is normal.        Bilateral hip pain    Hx of bilateral hip replacements      - Discussed need for hip revision surgery due to worn out socket in the right hip.  - the patient mentioned he saw Dr. Hernandez who told him that this would be a complicated procedure involving removal of the entire prosthesis.  - Follow up with orthopedic surgeon for evaluation of right hip; Dr. Trevizo.  - return to clinic as scheduled Dec 17th.

## 2024-12-17 ENCOUNTER — OFFICE VISIT (OUTPATIENT)
Dept: ORTHOPEDICS | Facility: CLINIC | Age: 69
End: 2024-12-17
Payer: MEDICARE

## 2024-12-17 VITALS — HEIGHT: 72 IN | WEIGHT: 164.88 LBS | BODY MASS INDEX: 22.33 KG/M2

## 2024-12-17 DIAGNOSIS — Z01.818 PRE-OP TESTING: Primary | ICD-10-CM

## 2024-12-17 DIAGNOSIS — M25.351 HIP INSTABILITY, RIGHT: ICD-10-CM

## 2024-12-17 PROCEDURE — 99214 OFFICE O/P EST MOD 30 MIN: CPT | Mod: S$PBB,,, | Performed by: ORTHOPAEDIC SURGERY

## 2024-12-17 PROCEDURE — 99215 OFFICE O/P EST HI 40 MIN: CPT | Mod: PBBFAC,PO | Performed by: ORTHOPAEDIC SURGERY

## 2024-12-17 PROCEDURE — 99999 PR PBB SHADOW E&M-EST. PATIENT-LVL V: CPT | Mod: PBBFAC,,, | Performed by: ORTHOPAEDIC SURGERY

## 2024-12-17 RX ORDER — MULTIVITAMIN
1 TABLET ORAL DAILY
COMMUNITY

## 2024-12-17 RX ORDER — ZINC GLUCONATE 50 MG
50 TABLET ORAL DAILY
COMMUNITY

## 2024-12-17 RX ORDER — TRAZODONE HYDROCHLORIDE 100 MG/1
100 TABLET ORAL NIGHTLY
COMMUNITY

## 2024-12-17 RX ORDER — ASCORBIC ACID 500 MG
500 TABLET ORAL DAILY
COMMUNITY

## 2024-12-17 RX ORDER — METHOCARBAMOL 750 MG/1
500 TABLET, FILM COATED ORAL 4 TIMES DAILY
COMMUNITY

## 2024-12-18 RX ORDER — CEFAZOLIN SODIUM 2 G/50ML
2 SOLUTION INTRAVENOUS
OUTPATIENT
Start: 2024-12-18

## 2024-12-18 RX ORDER — SODIUM CHLORIDE 0.9 % (FLUSH) 0.9 %
10 SYRINGE (ML) INJECTION EVERY 6 HOURS PRN
Status: SHIPPED | OUTPATIENT
Start: 2025-01-13

## 2025-01-02 ENCOUNTER — LAB VISIT (OUTPATIENT)
Dept: LAB | Facility: HOSPITAL | Age: 70
End: 2025-01-02
Attending: ORTHOPAEDIC SURGERY
Payer: MEDICARE

## 2025-01-02 DIAGNOSIS — Z01.818 PRE-OP TESTING: ICD-10-CM

## 2025-01-02 LAB
ANION GAP SERPL CALC-SCNC: 9 MMOL/L (ref 8–16)
BASOPHILS # BLD AUTO: 0.01 K/UL (ref 0–0.2)
BASOPHILS NFR BLD: 0.2 % (ref 0–1.9)
BUN SERPL-MCNC: 12 MG/DL (ref 8–23)
CALCIUM SERPL-MCNC: 9 MG/DL (ref 8.7–10.5)
CHLORIDE SERPL-SCNC: 104 MMOL/L (ref 95–110)
CO2 SERPL-SCNC: 24 MMOL/L (ref 23–29)
CREAT SERPL-MCNC: 0.8 MG/DL (ref 0.5–1.4)
DIFFERENTIAL METHOD BLD: ABNORMAL
EOSINOPHIL # BLD AUTO: 0.1 K/UL (ref 0–0.5)
EOSINOPHIL NFR BLD: 2 % (ref 0–8)
ERYTHROCYTE [DISTWIDTH] IN BLOOD BY AUTOMATED COUNT: 16.1 % (ref 11.5–14.5)
EST. GFR  (NO RACE VARIABLE): >60 ML/MIN/1.73 M^2
GLUCOSE SERPL-MCNC: 225 MG/DL (ref 70–110)
HCT VFR BLD AUTO: 39.6 % (ref 40–54)
HGB BLD-MCNC: 12.6 G/DL (ref 14–18)
IMM GRANULOCYTES # BLD AUTO: 0.02 K/UL (ref 0–0.04)
IMM GRANULOCYTES NFR BLD AUTO: 0.4 % (ref 0–0.5)
LYMPHOCYTES # BLD AUTO: 1.1 K/UL (ref 1–4.8)
LYMPHOCYTES NFR BLD: 24.2 % (ref 18–48)
MCH RBC QN AUTO: 28.8 PG (ref 27–31)
MCHC RBC AUTO-ENTMCNC: 31.8 G/DL (ref 32–36)
MCV RBC AUTO: 90 FL (ref 82–98)
MONOCYTES # BLD AUTO: 0.3 K/UL (ref 0.3–1)
MONOCYTES NFR BLD: 6.6 % (ref 4–15)
NEUTROPHILS # BLD AUTO: 3 K/UL (ref 1.8–7.7)
NEUTROPHILS NFR BLD: 66.6 % (ref 38–73)
NRBC BLD-RTO: 0 /100 WBC
PLATELET # BLD AUTO: 205 K/UL (ref 150–450)
PMV BLD AUTO: 11.3 FL (ref 9.2–12.9)
POTASSIUM SERPL-SCNC: 4 MMOL/L (ref 3.5–5.1)
RBC # BLD AUTO: 4.38 M/UL (ref 4.6–6.2)
SODIUM SERPL-SCNC: 137 MMOL/L (ref 136–145)
WBC # BLD AUTO: 4.55 K/UL (ref 3.9–12.7)

## 2025-01-02 PROCEDURE — 36415 COLL VENOUS BLD VENIPUNCTURE: CPT | Mod: PO | Performed by: ORTHOPAEDIC SURGERY

## 2025-01-02 PROCEDURE — 80048 BASIC METABOLIC PNL TOTAL CA: CPT | Performed by: ORTHOPAEDIC SURGERY

## 2025-01-02 PROCEDURE — 85025 COMPLETE CBC W/AUTO DIFF WBC: CPT | Performed by: ORTHOPAEDIC SURGERY

## 2025-01-03 ENCOUNTER — TELEPHONE (OUTPATIENT)
Dept: ORTHOPEDICS | Facility: CLINIC | Age: 70
End: 2025-01-03
Payer: MEDICARE

## 2025-01-03 NOTE — TELEPHONE ENCOUNTER
----- Message from Adelfo Salinas sent at 1/3/2025  2:18 PM CST -----  Contact: pt  Missed call   Call back

## 2025-01-03 NOTE — H&P
Chief Complaint   Patient presents with    Right Hip - Pain    Left Hip - Pain       HPI:   This is a 69 y.o. who returns today status post right ESTRELLA at [facility] 3 decades ago. Patient has been FWB.  Pain is throbbing. No numbness or tingling. No associated signs or symptoms.    Past Medical History:   Diagnosis Date    Anemia     Carpal tunnel syndrome, left     Cervical radiculopathy     Chronic Heart Failure     Chronic low back pain     Colon polyp     DDD (degenerative disc disease), lumbar     Diabetes mellitus, type 2     Foot drop, right     Gallstone     & sludge    GERD (gastroesophageal reflux disease)     Hepatic steatosis     Hepatomegaly     History of DVT 10/29/2013    s/p R knee sx    HNP (herniated nucleus pulposus), lumbar     Hyperlipidemia     Hypertension     Lumbar pseudoarthrosis     Lumbar radiculopathy     Neuropathy     Osteoarthritis     Pancreatic cyst     seeing Dr. Hernandez    PVC (premature ventricular contraction)     Recurrent herniation of lumbar disc     TIA (transient ischemic attack) 2010     Past Surgical History:   Procedure Laterality Date    ARTHROPLASTY Right 06/23/2014    Patella    BONE GRAFT      Graft from LEFT hip to RIGHT foot    CARPAL TUNNEL RELEASE Left     CATARACT EXTRACTION Bilateral     CHOLECYSTECTOMY  01/03/2023    COLONOSCOPY  2007    [facility]x [facility]    COLONOSCOPY N/A 7/3/2024    Procedure: COLONOSCOPY;  Surgeon: Mikal Page MD;  Location: [facility];  Service: Gastroenterology;  Laterality: N/A;    CORONARY ARTERY BYPASS GRAFT  05/2023    x3 vessels, [place]    ELBOW SURGERY Bilateral     ENDOSCOPIC ULTRASOUND OF UPPER GASTROINTESTINAL TRACT Left 11/23/2022    Procedure: ULTRASOUND, UPPER GI TRACT, ENDOSCOPIC;  Surgeon: Zain Hernandez MD;  Location: [facility];  Service: Endoscopy;  Laterality: Left;  GIF plus Linear    ESOPHAGOGASTRODUODENOSCOPY N/A 08/31/2022    Procedure: EGD (ESOPHAGOGASTRODUODENOSCOPY);  Surgeon:  Zain Hernandez MD;  Location: [facility];  Service: Endoscopy;  Laterality: N/A;    ESOPHAGOGASTRODUODENOSCOPY N/A 11/23/2022    Procedure: EGD (ESOPHAGOGASTRODUODENOSCOPY);  Surgeon: Zain Hernandez MD;  Location: [facility] ENDO;  Service: Endoscopy;  Laterality: N/A;    CIRO FILTER PLACEMENT  2015    HIP FUSION Right 04/2018    INGUINAL HERNIA REPAIR  1967    JOINT REPLACEMENT      bilateral hip replacement    KNEE ARTHROPLASTY Right 02/01/2016    Revision    KNEE ARTHROSCOPY Right 07/22/2013    KNEE ARTHROSCOPY Right 10/06/2014    KNEE SURGERY Right 08/01/2013    ARTHROCENTESIS    LUMBAR EPIDURAL INJECTION      LUMBAR FUSION  06/09/2022    WINTER L3-L5,PLIF L5-S1 with Possible Direct Decompression L3-S1    LUMBAR FUSION  10/2023    L3-S1, Revision d/t displaced screws    MICRODISCECTOMY OF SPINE  12/01/2016    LUMBAR    MICRODISCECTOMY OF SPINE  12/27/2018    LUMBAR    NASAL SEPTUM SURGERY  1979    Deviated septum    ROTATOR CUFF REPAIR Right 2022    SCAPHOID FRACTURE SURGERY      Navicular Fusion    SI JOINT FUSION Right 04/05/2018    TONSILLECTOMY      TOTAL HIP ARTHROPLASTY Right 1993    TOTAL HIP ARTHROPLASTY Left 1992    UPPER GASTROINTESTINAL ENDOSCOPY       Current Outpatient Medications on File Prior to Visit   Medication Sig Dispense Refill    alogliptin (NESINA) 25 mg Tab Take by mouth.      ascorbic acid, vitamin C, (VITAMIN C) 500 MG tablet Take 500 mg by mouth once daily.      atorvastatin (LIPITOR) 40 MG tablet Take 1 tablet (40 mg total) by mouth daily      cyanocobalamin (VITAMIN B-12) 1000 MCG tablet Take 100 mcg by mouth once daily.      empagliflozin (JARDIANCE) 25 mg tablet Take 25 mg by mouth once daily.      flash glucose scanning reader (FREESTYLE NATALIIA 2 READER) Misc 1 each by Other route.      flash glucose sensor (FREESTYLE NATALIIA 2 SENSOR) Kit 1 each by Other route.      gabapentin (NEURONTIN) 300 MG capsule Take 300 mg by mouth 3 (three) times daily.      methocarbamoL  (ROBAXIN) 750 MG Tab Take 500 mg by mouth 4 (four) times daily.      multivitamin (THERAGRAN) per tablet Take 1 tablet by mouth once daily.      oxyCODONE (ROXICODONE) 15 MG Tab TAKE ONE TABLET BY MOUTH EVERY TWELVE HOURS AS NEEDED FOR PAIN MAY CAUSE DROWSINESS CAN FILL 10-7-20      pantoprazole (PROTONIX) 40 MG tablet Take 40 mg by mouth once daily.      rosuvastatin (CRESTOR) 40 MG Tab 40 mg.      sacubitriL-valsartan (ENTRESTO) 24-26 mg per tablet TAKE ONE 24/26MG TABLET BY MOUTH TWICE A DAY FOR CHRONIC HEART FAILURE      SITagliptin phosphate (JANUVIA) 100 MG Tab Take 100 mg by mouth once daily.      traZODone (DESYREL) 100 MG tablet Take 100 mg by mouth every evening.      zinc gluconate 50 mg tablet Take 50 mg by mouth once daily.      polyethylene glycol (GLYCOLAX) 17 gram/dose powder Take 17 g by mouth once daily.       No current facility-administered medications on file prior to visit.     Review of patient's allergies indicates:  No Known Allergies  Family History   Problem Relation Name Age of Onset    Heart disease Mother      Arthritis Mother      Stroke Father      Heart disease Father      Heart attack Father      Heart disease Brother      Leukemia Brother      Collagen disease Neg Hx      Colon cancer Neg Hx      Crohn's disease Neg Hx      Esophageal cancer Neg Hx      Ulcerative colitis Neg Hx      Stomach cancer Neg Hx       Social History     Socioeconomic History    Marital status:    Tobacco Use    Smoking status: Never    Smokeless tobacco: Never   Substance and Sexual Activity    Alcohol use: Yes     Alcohol/week: 2.0 standard drinks of alcohol     Types: 2 Cans of beer per week    Drug use: No    Sexual activity: Yes     Partners: Female     Social Drivers of Health     Financial Resource Strain: Low Risk  (11/12/2024)    Overall Financial Resource Strain (CARDIA)     Difficulty of Paying Living Expenses: Not hard at all   Food Insecurity: No Food Insecurity (11/12/2024)    Hunger  Vital Sign     Worried About Running Out of Food in the Last Year: Never true     Ran Out of Food in the Last Year: Never true   Transportation Needs: No Transportation Needs (10/15/2024)    Received from [facility] - Transportation     Lack of Transportation (Medical): No     Lack of Transportation (Non-Medical): No   Physical Activity: Insufficiently Active (11/12/2024)    Exercise Vital Sign     Days of Exercise per Week: 2 days     Minutes of Exercise per Session: 10 min   Stress: No Stress Concern Present (11/12/2024)    [employer] - Occupational Stress Questionnaire     Feeling of Stress : Only a little   Housing Stability: High Risk (11/12/2024)    Housing Stability Vital Sign     Unable to Pay for Housing in the Last Year: Yes       Review of Systems:  Constitutional:  Denies fever or chills   Eyes:  Denies change in visual acuity   HENT:  Denies nasal congestion or sore throat   Respiratory:  Denies cough or shortness of breath   Cardiovascular:  Denies chest pain or edema   GI:  Denies abdominal pain, nausea, vomiting, bloody stools or diarrhea   :  Denies dysuria   Integument:  Denies rash   Neurologic:  Denies headache, focal weakness or sensory changes   Endocrine:  Denies polyuria or polydipsia   Lymphatic:  Denies swollen glands   Psychiatric:  Denies depression or anxiety     Physical Exam:   Constitutional:  Well developed, well nourished, no acute distress, non-toxic appearance   Integument:  Well hydrated, no rash   Lymphatic:  No lymphadenopathy noted   Neurologic:  Alert & oriented x 3, CN 2-12 normal, normal motor function, normal sensory function, no focal deficits noted   Psychiatric:  Speech and behavior appropriate   Gi: abdomen soft  Eyes: EOMI    L hip  Exam performed same as contralateral side and is normal  R hip  Diffuse point TTP about the groin. Mild shuck noted. Overall normal alignment. Skin intact. Foot perfused.     X-rays  were performed today, personally reviewed by me and findings discussed with the patient.  2 views of the right hip show implants intact with increased eccentric poly wear to the right prosthesis      Pre-op testing  -     CBC Auto Differential; Future; Expected date: 12/17/2024  -     Basic Metabolic Panel; Future; Expected date: 12/17/2024  -     CBC auto differential; Future; Expected date: 12/18/2024  -     Comprehensive metabolic panel; Future; Expected date: 12/18/2024    Hip instability, right  -     Vital signs; Standing  -     Diet NPO; Standing  -     Place KISHOR hose; Standing  -     Place sequential compression device; Standing  -     Case Request Operating Room: REVISION, TOTAL ARTHROPLASTY, HIP  -     Admit to Inpatient; Standing    Other orders  -     sodium chloride 0.9% flush 10 mL  -     IP VTE LOW RISK PATIENT; Standing  -     tranexamic acid (CYKLOKAPRON) 1,000 mg in 0.9% NaCl 100 mL IVPB (MB+)  -     cefazolin (ANCEF) 2 gram in dextrose 5% 50 mL IVPB (premix)          I had a long discussion with the patient regarding the benefits and risks of revision right ESTRELLA. They voiced understanding and wish to proceed with surgery. Consents signed in clinic.

## 2025-01-13 PROBLEM — M25.351 HIP INSTABILITY, RIGHT: Status: ACTIVE | Noted: 2025-01-13

## 2025-01-27 ENCOUNTER — PATIENT MESSAGE (OUTPATIENT)
Dept: ORTHOPEDICS | Facility: CLINIC | Age: 70
End: 2025-01-27
Payer: OTHER GOVERNMENT

## 2025-01-28 ENCOUNTER — HOSPITAL ENCOUNTER (OUTPATIENT)
Dept: RADIOLOGY | Facility: HOSPITAL | Age: 70
Discharge: HOME | End: 2025-01-28
Attending: ORTHOPAEDIC SURGERY
Payer: MEDICARE

## 2025-01-28 ENCOUNTER — OFFICE VISIT (OUTPATIENT)
Dept: ORTHOPEDICS | Facility: CLINIC | Age: 70
End: 2025-01-28
Payer: MEDICARE

## 2025-01-28 VITALS — HEIGHT: 72 IN | WEIGHT: 164 LBS | BODY MASS INDEX: 22.21 KG/M2

## 2025-01-28 DIAGNOSIS — M25.351 HIP INSTABILITY, RIGHT: ICD-10-CM

## 2025-01-28 DIAGNOSIS — M25.351 HIP INSTABILITY, RIGHT: Primary | ICD-10-CM

## 2025-01-28 DIAGNOSIS — Z01.818 PRE-OP TESTING: ICD-10-CM

## 2025-01-28 DIAGNOSIS — M25.352 HIP INSTABILITY, LEFT: Primary | ICD-10-CM

## 2025-01-28 PROCEDURE — 99214 OFFICE O/P EST MOD 30 MIN: CPT | Mod: S$PBB,24,, | Performed by: ORTHOPAEDIC SURGERY

## 2025-01-28 PROCEDURE — 99999 PR PBB SHADOW E&M-EST. PATIENT-LVL V: CPT | Mod: PBBFAC,,, | Performed by: ORTHOPAEDIC SURGERY

## 2025-01-28 PROCEDURE — 73502 X-RAY EXAM HIP UNI 2-3 VIEWS: CPT | Mod: 26,RT,, | Performed by: RADIOLOGY

## 2025-01-28 PROCEDURE — 99215 OFFICE O/P EST HI 40 MIN: CPT | Mod: PBBFAC,25,PO | Performed by: ORTHOPAEDIC SURGERY

## 2025-01-28 PROCEDURE — 73502 X-RAY EXAM HIP UNI 2-3 VIEWS: CPT | Mod: TC,PO,RT

## 2025-01-28 RX ORDER — CEFAZOLIN SODIUM 2 G/50ML
2 SOLUTION INTRAVENOUS
OUTPATIENT
Start: 2025-01-28

## 2025-01-28 RX ORDER — SODIUM CHLORIDE 0.9 % (FLUSH) 0.9 %
10 SYRINGE (ML) INJECTION EVERY 6 HOURS PRN
Status: SHIPPED | OUTPATIENT
Start: 2025-03-10

## 2025-01-31 NOTE — H&P
Chief Complaint   Patient presents with    Right Hip - Post-op Evaluation     R ESTRELLA 01/13/25        HPI:    This is a 69 y.o. who presents today complaining of left hip pain for 5 years after undergoing ESTRELLA at OSH 25 years ago. Pain is dull. No numbness or tingling. No associated signs or symptoms. He is unable to walk with the left hip.      Past Medical History:   Diagnosis Date    Anemia     Carpal tunnel syndrome, left     Cervical radiculopathy     Chronic Heart Failure     Chronic low back pain     Colon polyp     Coronary artery disease     DDD (degenerative disc disease), lumbar     Diabetes mellitus, type 2     Encounter for blood transfusion     Foot drop, right     Gallstone     & sludge    GERD (gastroesophageal reflux disease)     Hepatic steatosis     Hepatomegaly     History of DVT 10/29/2013    s/p R knee sx    HNP (herniated nucleus pulposus), lumbar     Hyperlipidemia     Hypertension     Lumbar pseudoarthrosis     Lumbar radiculopathy     Neuropathy     Osteoarthritis     Pancreatic cyst     seeing Dr. Hernandez    PVC (premature ventricular contraction)     Recurrent herniation of lumbar disc     TIA (transient ischemic attack) 2010      Past Surgical History:   Procedure Laterality Date    ARTHROPLASTY Right 06/23/2014    Patella    BONE GRAFT      Graft from LEFT hip to RIGHT foot    CARPAL TUNNEL RELEASE Left     CATARACT EXTRACTION Bilateral     CHOLECYSTECTOMY  01/03/2023    COLONOSCOPY  2007    [facility]x polyp, [facility]    COLONOSCOPY N/A 7/3/2024    Procedure: COLONOSCOPY;  Surgeon: Mikal Page MD;  Location: [facility];  Service: Gastroenterology;  Laterality: N/A;    CORONARY ARTERY BYPASS GRAFT  05/2023    x3 vessels, [place]    ELBOW SURGERY Bilateral     ENDOSCOPIC ULTRASOUND OF UPPER GASTROINTESTINAL TRACT Left 11/23/2022    Procedure: ULTRASOUND, UPPER GI TRACT, ENDOSCOPIC;  Surgeon: Zain Hernandez MD;  Location: [facility];  Service: Endoscopy;  Laterality:  Left;  GIF plus Linear    ESOPHAGOGASTRODUODENOSCOPY N/A 08/31/2022    Procedure: EGD (ESOPHAGOGASTRODUODENOSCOPY);  Surgeon: Zain Hernandez MD;  Location: [facility];  Service: Endoscopy;  Laterality: N/A;    ESOPHAGOGASTRODUODENOSCOPY N/A 11/23/2022    Procedure: EGD (ESOPHAGOGASTRODUODENOSCOPY);  Surgeon: Zain Hernandez MD;  Location: [facility] ENDO;  Service: Endoscopy;  Laterality: N/A;    CIRO FILTER PLACEMENT  2015    HIP FUSION Right 04/2018    INGUINAL HERNIA REPAIR  1967    JOINT REPLACEMENT      bilateral hip replacement    KNEE ARTHROPLASTY Right 02/01/2016    Revision    KNEE ARTHROSCOPY Right 07/22/2013    KNEE ARTHROSCOPY Right 10/06/2014    KNEE SURGERY Right 08/01/2013    ARTHROCENTESIS    LUMBAR EPIDURAL INJECTION      LUMBAR FUSION  06/09/2022    WINTER L3-L5,PLIF L5-S1 with Possible Direct Decompression L3-S1    LUMBAR FUSION  10/2023    L3-S1, Revision d/t displaced screws    MICRODISCECTOMY OF SPINE  12/01/2016    LUMBAR    MICRODISCECTOMY OF SPINE  12/27/2018    LUMBAR    NASAL SEPTUM SURGERY  1979    Deviated septum    REVISION TOTAL HIP ARTHROPLASTY Right 1/13/2025    Procedure: REVISION, TOTAL ARTHROPLASTY, HIP;  Surgeon: Jorge Trevizo MD;  Location: [facility];  Service: Orthopedics;  Laterality: Right;    ROTATOR CUFF REPAIR Right 2022    SCAPHOID FRACTURE SURGERY      Navicular Fusion    SI JOINT FUSION Right 04/05/2018    TONSILLECTOMY      TOTAL HIP ARTHROPLASTY Right 1993    TOTAL HIP ARTHROPLASTY Left 1992    UPPER GASTROINTESTINAL ENDOSCOPY        Current Outpatient Medications on File Prior to Visit   Medication Sig Dispense Refill    acetaminophen (TYLENOL) 500 MG tablet Take 2 tablets (1,000 mg total) by mouth every 8 (eight) hours. (Patient taking differently: Take 1,000 mg by mouth every 8 (eight) hours.) 90 tablet 0    alogliptin (NESINA) 25 mg Tab Take by mouth.      ascorbic acid, vitamin C, (VITAMIN C) 500 MG tablet Take 500 mg by mouth once daily.      aspirin  325 MG tablet Take 325 mg by mouth once daily.      cyanocobalamin (VITAMIN B-12) 1000 MCG tablet Take 100 mcg by mouth once daily.      empagliflozin (JARDIANCE) 25 mg tablet Take 25 mg by mouth once daily.      flash glucose scanning reader (FREESTYLE NATALIIA 2 READER) Misc 1 each by Other route.      flash glucose sensor (FREESTYLE NATALIIA 2 SENSOR) Kit 1 each by Other route.      gabapentin (NEURONTIN) 300 MG capsule Take 300 mg by mouth 3 (three) times daily.      ibuprofen (ADVIL,MOTRIN) 800 MG tablet Take 1 tablet (800 mg total) by mouth 3 (three) times daily. 90 tablet 0    multivitamin (THERAGRAN) per tablet Take 1 tablet by mouth once daily.      oxyCODONE (ROXICODONE) 15 MG Tab Take 1 tablet (15 mg total) by mouth every 4 (four) hours as needed for Pain. (Patient taking differently: Take 15 mg by mouth every 4 (four) hours as needed for Pain.) 44 tablet 0    pantoprazole (PROTONIX) 40 MG tablet Take 40 mg by mouth once daily.      rosuvastatin (CRESTOR) 40 MG Tab Take 40 mg by mouth once daily.      sacubitriL-valsartan (ENTRESTO) 24-26 mg per tablet TAKE ONE 24/26MG TABLET BY MOUTH TWICE A DAY FOR CHRONIC HEART FAILURE      sennosides 8.6 mg Cap Take 8.6 mg by mouth daily as needed (constipation). Indications: constipation      SITagliptin phosphate (JANUVIA) 100 MG Tab Take 100 mg by mouth once daily.      traMADoL (ULTRAM) 50 mg tablet Take 1 tablet (50 mg total) by mouth every 4 (four) hours as needed for Pain. (Patient taking differently: Take 50 mg by mouth every 4 (four) hours as needed for Pain.) 44 tablet 0    traZODone (DESYREL) 100 MG tablet Take 100 mg by mouth every evening.      zinc gluconate 50 mg tablet Take 50 mg by mouth once daily.       Current Facility-Administered Medications on File Prior to Visit   Medication Dose Route Frequency Provider Last Rate Last Admin    sodium chloride 0.9% flush 10 mL  10 mL Intravenous Q6H PRN Jorge Trevizo MD          Review of patient's allergies  indicates:  No Known Allergies   Family History not pertinent   Social History     Socioeconomic History    Marital status:    Tobacco Use    Smoking status: Never    Smokeless tobacco: Never   Substance and Sexual Activity    Alcohol use: Yes     Alcohol/week: 2.0 standard drinks of alcohol     Types: 2 Cans of beer per week    Drug use: No    Sexual activity: Yes     Partners: Female     Social Drivers of Health     Financial Resource Strain: Low Risk  (11/12/2024)    Overall Financial Resource Strain (CARDIA)     Difficulty of Paying Living Expenses: Not hard at all   Food Insecurity: No Food Insecurity (11/12/2024)    Hunger Vital Sign     Worried About Running Out of Food in the Last Year: Never true     Ran Out of Food in the Last Year: Never true   Transportation Needs: No Transportation Needs (1/27/2025)    [facility] : Transportation     Lack of Transportation (Medical): No     Lack of Transportation (Non-Medical): No     Patient Unable or Declines to Respond: No   Physical Activity: Insufficiently Active (11/12/2024)    Exercise Vital Sign     Days of Exercise per Week: 2 days     Minutes of Exercise per Session: 10 min   Stress: No Stress Concern Present (11/12/2024)    [employer] - Occupational Stress Questionnaire     Feeling of Stress : Only a little   Housing Stability: High Risk (11/12/2024)    Housing Stability Vital Sign     Unable to Pay for Housing in the Last Year: Yes         Review of Systems:   Constitutional:  Denies fever or chills    Eyes:  Denies change in visual acuity    HENT:  Denies nasal congestion or sore throat    Respiratory:  Denies cough or shortness of breath    Cardiovascular:  Denies chest pain or edema    GI:  Denies abdominal pain, nausea, vomiting, bloody stools or diarrhea    :  Denies dysuria    Integument:  Denies rash    Neurologic:  Denies headache, focal weakness or sensory changes    Endocrine:  Denies polyuria or polydipsia     Lymphatic:  Denies swollen glands    Psychiatric:  Denies depression or anxiety       Physical Exam:    Constitutional:  Well developed, well nourished, no acute distress, non-toxic appearance    Integument:  Well hydrated, no rash    Lymphatic:  No lymphadenopathy noted    Neurologic:  Alert & oriented x 3,     Psychiatric:  Speech and behavior appropriate    Gi: abdomen soft  Eyes: EOMI   R hip  Exam performed same as contralateral side and is normal  L hip  Diffuse point TTP about the groin. Overall normal alignment. Skin intact. Compartments soft. NVI distally.      X-rays were performed today, personally reviewed by me and findings discussed with the patient.   2 views of the left hip show implants intact in good position with eccentric poly wear      Hip instability, left  -     Vital signs; Standing  -     Diet NPO; Standing  -     Case Request Operating Room: REVISION, TOTAL ARTHROPLASTY, HIP  -     Admit to Inpatient; Standing  -     Place KISHOR hose; Standing  -     Place sequential compression device; Standing    Pre-op testing  -     CBC auto differential; Future; Expected date: 01/28/2025  -     Comprehensive metabolic panel; Future; Expected date: 01/28/2025    Other orders  -     sodium chloride 0.9% flush 10 mL  -     IP VTE HIGH RISK PATIENT; Standing  -     tranexamic acid (CYKLOKAPRON) 1,000 mg in 0.9% NaCl 100 mL IVPB (MB+)  -     cefazolin (ANCEF) 2 gram in dextrose 5% 50 mL IVPB (premix)          I had a long discussion with the patient regarding the benefits and risks of left hip revision. They voiced understanding and wish to proceed with surgery. Consents signed in clinic.

## 2025-02-10 DIAGNOSIS — M25.559 HIP PAIN, UNSPECIFIED LATERALITY: Primary | ICD-10-CM

## 2025-02-11 ENCOUNTER — OFFICE VISIT (OUTPATIENT)
Dept: ORTHOPEDICS | Facility: CLINIC | Age: 70
End: 2025-02-11
Payer: MEDICARE

## 2025-02-11 ENCOUNTER — HOSPITAL ENCOUNTER (OUTPATIENT)
Dept: RADIOLOGY | Facility: HOSPITAL | Age: 70
Discharge: HOME | End: 2025-02-11
Attending: NURSE PRACTITIONER
Payer: MEDICARE

## 2025-02-11 VITALS
HEIGHT: 72 IN | BODY MASS INDEX: 22.21 KG/M2 | DIASTOLIC BLOOD PRESSURE: 84 MMHG | SYSTOLIC BLOOD PRESSURE: 140 MMHG | WEIGHT: 164 LBS | HEART RATE: 81 BPM

## 2025-02-11 DIAGNOSIS — Z96.641 HISTORY OF REVISION OF TOTAL REPLACEMENT OF RIGHT HIP JOINT: Primary | ICD-10-CM

## 2025-02-11 DIAGNOSIS — M25.559 HIP PAIN, UNSPECIFIED LATERALITY: ICD-10-CM

## 2025-02-11 DIAGNOSIS — Z98.890 POST-OPERATIVE STATE: ICD-10-CM

## 2025-02-11 PROCEDURE — 73502 X-RAY EXAM HIP UNI 2-3 VIEWS: CPT | Mod: 26,RT,, | Performed by: RADIOLOGY

## 2025-02-11 PROCEDURE — 99999 PR PBB SHADOW E&M-EST. PATIENT-LVL IV: CPT | Mod: PBBFAC,,, | Performed by: NURSE PRACTITIONER

## 2025-02-11 PROCEDURE — 99214 OFFICE O/P EST MOD 30 MIN: CPT | Mod: PBBFAC,25,PO | Performed by: NURSE PRACTITIONER

## 2025-02-11 PROCEDURE — 73502 X-RAY EXAM HIP UNI 2-3 VIEWS: CPT | Mod: TC,PO,RT

## 2025-02-11 PROCEDURE — 99024 POSTOP FOLLOW-UP VISIT: CPT | Mod: POP,,, | Performed by: NURSE PRACTITIONER

## 2025-02-11 NOTE — PROGRESS NOTES
Chief Complaint   Patient presents with    Right Hip - Pain       HPI:  69 y.o. returns to clinic today status post  right total hip arthroplasty done about  4 weeks ago. Pain is intermittent. Patient is compliant most of the time with restrictions. He reports a lot of swelling to his outer right hip      right Hip Exam   Tenderness   The patient is experiencing no tenderness in the groin.   Large hematoma to outer right thigh    Range of Motion   The patient has no decreased internal rotation  hip.    Muscle Strength   Flexion: 4/5     Other   Erythema: absent  Sensation: normal  Pulse: present          X-rays were performed today, personally reviewed by me and findings discussed with the patient.  2 views of the right hip show implants intact.       History of revision of total replacement of right hip joint    Post-operative state        Strict ice   Return to clinic as scheduled with Dr. Trevizo      [facility]

## 2025-02-18 ENCOUNTER — OFFICE VISIT (OUTPATIENT)
Dept: ORTHOPEDICS | Facility: CLINIC | Age: 70
End: 2025-02-18
Payer: MEDICARE

## 2025-02-18 VITALS
BODY MASS INDEX: 22.21 KG/M2 | DIASTOLIC BLOOD PRESSURE: 84 MMHG | WEIGHT: 164 LBS | HEART RATE: 81 BPM | SYSTOLIC BLOOD PRESSURE: 140 MMHG | HEIGHT: 72 IN

## 2025-02-18 DIAGNOSIS — Z96.641 HISTORY OF REVISION OF TOTAL REPLACEMENT OF RIGHT HIP JOINT: Primary | ICD-10-CM

## 2025-02-18 PROCEDURE — 99214 OFFICE O/P EST MOD 30 MIN: CPT | Mod: PBBFAC,PO | Performed by: NURSE PRACTITIONER

## 2025-02-18 PROCEDURE — 99024 POSTOP FOLLOW-UP VISIT: CPT | Mod: POP,,, | Performed by: NURSE PRACTITIONER

## 2025-02-18 PROCEDURE — 99999 PR PBB SHADOW E&M-EST. PATIENT-LVL IV: CPT | Mod: PBBFAC,,, | Performed by: NURSE PRACTITIONER

## 2025-03-05 NOTE — PROGRESS NOTES
Chief Complaint   Patient presents with    Right Hip - Pain       HPI:  69 y.o. returns to clinic today status post revision of right total hip arthroplasty done  5 weeks ago. Pain is intermittent. Patient is compliant most of the time      right Hip Exam   Tenderness   The patient is experiencing no tenderness in the groin.   Large hematoma to outer right thigh     Range of Motion   The patient has no decreased internal rotation  hip.     Muscle Strength   Flexion: 4/5      Other   Erythema: absent  Sensation: normal  Pulse: present          History of revision of total replacement of right hip joint          Using an aseptic technique, Dr. Trevizo aspirated the right outer hip hematoma with 50 mL serosang fluid collected.  The patient tolerated this well. return to clinic as scheduled.

## 2025-03-10 PROBLEM — M25.352 HIP INSTABILITY, LEFT: Status: ACTIVE | Noted: 2025-03-10

## 2025-03-10 PROBLEM — Z73.6 LIMITATION OF ACTIVITIES DUE TO DISABILITY: Status: ACTIVE | Noted: 2025-03-10

## 2025-03-14 ENCOUNTER — TELEPHONE (OUTPATIENT)
Dept: ORTHOPEDICS | Facility: CLINIC | Age: 70
End: 2025-03-14
Payer: OTHER GOVERNMENT

## 2025-03-14 DIAGNOSIS — M25.352 HIP INSTABILITY, LEFT: Primary | ICD-10-CM

## 2025-03-14 DIAGNOSIS — Z96.642 HISTORY OF REVISION OF TOTAL REPLACEMENT OF LEFT HIP JOINT: ICD-10-CM

## 2025-03-14 NOTE — TELEPHONE ENCOUNTER
----- Message from Alex sent at 3/14/2025 10:27 AM CDT -----  Patient states that his home health provider hasn't received any ordersPlease call Patient--  [phone]

## 2025-03-19 ENCOUNTER — TELEPHONE (OUTPATIENT)
Dept: ORTHOPEDICS | Facility: CLINIC | Age: 70
End: 2025-03-19
Payer: OTHER GOVERNMENT

## 2025-03-19 NOTE — TELEPHONE ENCOUNTER
Spoke with pt regarding pain in groin. Pt stated pain has been going for 4 days. Says feels like pressure. Explained to pt this can be normal after surgery. Told pt in increase to 3-4 times a day with icing. Pt verbalized understanding.

## 2025-03-19 NOTE — TELEPHONE ENCOUNTER
----- Message from Med Assistant Esquivel sent at 3/19/2025  3:24 PM CDT -----  Contact: patient  Pt just had surgery last week but stated he is having a pain in his left groin area? Wants to know if that is normal or what he should do.Call back number is [phone]

## 2025-03-20 DIAGNOSIS — M70.62 GREATER TROCHANTERIC BURSITIS OF LEFT HIP: Primary | ICD-10-CM

## 2025-03-25 ENCOUNTER — OFFICE VISIT (OUTPATIENT)
Dept: ORTHOPEDICS | Facility: CLINIC | Age: 70
End: 2025-03-25
Payer: MEDICARE

## 2025-03-25 ENCOUNTER — HOSPITAL ENCOUNTER (OUTPATIENT)
Dept: RADIOLOGY | Facility: HOSPITAL | Age: 70
Discharge: HOME | End: 2025-03-25
Attending: ORTHOPAEDIC SURGERY
Payer: MEDICARE

## 2025-03-25 VITALS — WEIGHT: 160.06 LBS | BODY MASS INDEX: 21.21 KG/M2 | HEIGHT: 73 IN

## 2025-03-25 DIAGNOSIS — M70.62 GREATER TROCHANTERIC BURSITIS OF LEFT HIP: ICD-10-CM

## 2025-03-25 DIAGNOSIS — M25.352 HIP INSTABILITY, LEFT: Primary | ICD-10-CM

## 2025-03-25 PROCEDURE — 99214 OFFICE O/P EST MOD 30 MIN: CPT | Mod: PBBFAC,25,PO | Performed by: ORTHOPAEDIC SURGERY

## 2025-03-25 PROCEDURE — 99999 PR PBB SHADOW E&M-EST. PATIENT-LVL IV: CPT | Mod: PBBFAC,,, | Performed by: ORTHOPAEDIC SURGERY

## 2025-03-25 PROCEDURE — 73502 X-RAY EXAM HIP UNI 2-3 VIEWS: CPT | Mod: 26,LT,, | Performed by: RADIOLOGY

## 2025-03-25 PROCEDURE — 99024 POSTOP FOLLOW-UP VISIT: CPT | Mod: POP,,, | Performed by: ORTHOPAEDIC SURGERY

## 2025-03-25 PROCEDURE — 73502 X-RAY EXAM HIP UNI 2-3 VIEWS: CPT | Mod: TC,PO,LT

## 2025-03-28 DIAGNOSIS — M25.559 HIP PAIN, UNSPECIFIED LATERALITY: Primary | ICD-10-CM

## 2025-04-04 NOTE — PROGRESS NOTES
Chief Complaint   Patient presents with    Left Hip - Post-op Evaluation       HPI:  69 y.o. male returns to clinic today status post left total hip arthroplasty revision 2 weeks ago. Pain is now gone. Patient is compliant most of the time with restrictions.     left Hip Exam   Tenderness   The patient is experiencing no tenderness.   Incision healed    Range of Motion   The patient has normal right hip ROM.    Muscle Strength   Flexion: 4/5     Other   Erythema: absent  Sensation: normal  Pulse: present    X-rays were performed today, personally reviewed by me and findings discussed with the patient.  2 views of the left hip show implants intact with no evidence of loosening    Hip instability, left        Staples out. RTC 2 weeks for possible left hip bursal injection.

## 2025-04-11 ENCOUNTER — OFFICE VISIT (OUTPATIENT)
Dept: ORTHOPEDICS | Facility: CLINIC | Age: 70
End: 2025-04-11
Payer: MEDICARE

## 2025-04-11 VITALS
BODY MASS INDEX: 21.21 KG/M2 | HEART RATE: 88 BPM | WEIGHT: 160.06 LBS | DIASTOLIC BLOOD PRESSURE: 86 MMHG | SYSTOLIC BLOOD PRESSURE: 124 MMHG | HEIGHT: 73 IN

## 2025-04-11 DIAGNOSIS — M70.62 GREATER TROCHANTERIC BURSITIS OF LEFT HIP: Primary | ICD-10-CM

## 2025-04-11 PROCEDURE — 99999 PR PBB SHADOW E&M-EST. PATIENT-LVL V: CPT | Mod: PBBFAC,,, | Performed by: NURSE PRACTITIONER

## 2025-04-11 PROCEDURE — 99215 OFFICE O/P EST HI 40 MIN: CPT | Mod: PBBFAC,PO | Performed by: NURSE PRACTITIONER

## 2025-04-11 RX ORDER — TRIAMCINOLONE ACETONIDE 40 MG/ML
40 INJECTION, SUSPENSION INTRA-ARTICULAR; INTRAMUSCULAR
Status: DISCONTINUED | OUTPATIENT
Start: 2025-04-11 | End: 2025-04-11 | Stop reason: HOSPADM

## 2025-04-11 RX ADMIN — TRIAMCINOLONE ACETONIDE 40 MG: 40 INJECTION, SUSPENSION INTRA-ARTICULAR; INTRAMUSCULAR at 11:04

## 2025-04-11 NOTE — PROGRESS NOTES
Chief Complaint   Patient presents with    Left Hip - Pain      HPI:   This is a 69 y.o. who presents to clinic today for left hip pain for the past 2 weeks after no known trauma. Complains of pain while sleeping on side and when descending stairs. Pain is dull and deep. No numbness or tingling. No associated signs or symptoms.  States he is here for left hip bursa injection.       Past Medical History:   Diagnosis Date    Anemia     Carpal tunnel syndrome, left     Cervical radiculopathy     Chronic Heart Failure     Chronic low back pain     Colon polyp     Coronary artery disease     DDD (degenerative disc disease), lumbar     Diabetes mellitus, type 2     Encounter for blood transfusion     Foot drop, right     Gallstone     & sludge    GERD (gastroesophageal reflux disease)     Hepatic steatosis     Hepatomegaly     History of DVT 10/29/2013    s/p R knee sx    HNP (herniated nucleus pulposus), lumbar     Hyperlipidemia     Hypertension     Lumbar pseudoarthrosis     Lumbar radiculopathy     Neuropathy     Osteoarthritis     Pancreatic cyst     seeing Dr. Hernandez    PVC (premature ventricular contraction)     Recurrent herniation of lumbar disc     TIA (transient ischemic attack) 2010     Past Surgical History:   Procedure Laterality Date    ARTHROPLASTY Right 06/23/2014    Patella    BONE GRAFT      Graft from LEFT hip to RIGHT foot    CARPAL TUNNEL RELEASE Left     CATARACT EXTRACTION Bilateral     CHOLECYSTECTOMY  01/03/2023    [place]OSCOPY  [place]    [facility] [place]    COLONOSCOPY N/A 7/3/2024    Procedure: COLONOSCOPY;  Surgeon: Mikal Page MD;  Location: [facility];  Service: Gastroenterology;  Laterality: N/A;    CORONARY ARTERY BYPASS GRAFT  05/2023    x3 vessels, [place]    ELBOW SURGERY Bilateral     ENDOSCOPIC ULTRASOUND OF UPPER GASTROINTESTINAL TRACT Left 11/23/2022    Procedure: ULTRASOUND, UPPER GI TRACT, ENDOSCOPIC;  Surgeon: Zain Hernandez MD;  [facility]: [facility]  ENDO;  Service: Endoscopy;  Laterality: Left;  GIF plus Linear    ESOPHAGOGASTRODUODENOSCOPY N/A 08/31/2022    Procedure: EGD (ESOPHAGOGASTRODUODENOSCOPY);  Surgeon: Zain Hernandez MD;  Location: [facility];  Service: Endoscopy;  Laterality: N/A;    ESOPHAGOGASTRODUODENOSCOPY N/A 11/23/2022    Procedure: EGD (ESOPHAGOGASTRODUODENOSCOPY);  Surgeon: Zain Hernandez MD;  Location: [facility] ENDO;  Service: Endoscopy;  Laterality: N/A;    CIRO FILTER PLACEMENT  2015    HIP FUSION Right 04/2018    INGUINAL HERNIA REPAIR  1967    JOINT REPLACEMENT      bilateral hip replacement    KNEE ARTHROPLASTY Right 02/01/2016    Revision    KNEE ARTHROSCOPY Right 07/22/2013    KNEE ARTHROSCOPY Right 10/06/2014    KNEE SURGERY Right 08/01/2013    ARTHROCENTESIS    LUMBAR EPIDURAL INJECTION      LUMBAR FUSION  06/09/2022    WINTER L3-L5,PLIF L5-S1 with Possible Direct Decompression L3-S1    LUMBAR FUSION  10/2023    L3-S1, Revision d/t displaced screws    MICRODISCECTOMY OF SPINE  12/01/2016    LUMBAR    MICRODISCECTOMY OF SPINE  12/27/2018    LUMBAR    NASAL SEPTUM SURGERY  1979    Deviated septum    REVISION TOTAL HIP ARTHROPLASTY Right 1/13/2025    Procedure: REVISION, TOTAL ARTHROPLASTY, HIP;  Surgeon: Jorge Trevizo MD;  Location: [facility] OR;  Service: Orthopedics;  Laterality: Right;    REVISION TOTAL HIP ARTHROPLASTY Left 3/10/2025    Procedure: REVISION, TOTAL ARTHROPLASTY, HIP;  Surgeon: Jorge Trevizo MD;  Location: [facility] OR;  Service: Orthopedics;  Laterality: Left;    ROTATOR CUFF REPAIR Right 2022    SCAPHOID FRACTURE SURGERY      Navicular Fusion    SI JOINT FUSION Right 04/05/2018    TONSILLECTOMY      TOTAL HIP ARTHROPLASTY Right 1993    TOTAL HIP ARTHROPLASTY Left 1992    UPPER GASTROINTESTINAL ENDOSCOPY       Medications Ordered Prior to Encounter[1]  Review of patient's allergies indicates:  No Known Allergies  Family History   Problem Relation Name Age of Onset    Heart disease Mother      Arthritis Mother       Stroke Father      Heart disease Father      Heart attack Father      Heart disease Brother      Leukemia Brother      Collagen disease Neg Hx      Colon cancer Neg Hx      Crohn's disease Neg Hx      Esophageal cancer Neg Hx      Ulcerative colitis Neg Hx      Stomach cancer Neg Hx       Social History[2]    Review of Systems:  Constitutional:  Denies fever or chills   Eyes:  Denies change in visual acuity   HENT:  Denies nasal congestion or sore throat   Respiratory:  Denies cough or shortness of breath   Cardiovascular:  Denies chest pain or edema   GI:  Denies abdominal pain, nausea, vomiting, bloody stools or diarrhea   :  Denies dysuria   Integument:  Denies rash   Neurologic:  Denies headache, focal weakness or sensory changes   Endocrine:  Denies polyuria or polydipsia   Lymphatic:  Denies swollen glands   Psychiatric:  Denies depression or anxiety     Physical Exam:   Constitutional:  Well developed, well nourished, no acute distress, non-toxic appearance   Integument:  Well hydrated  Neurologic:  Alert & oriented x 3  Psychiatric:  Speech and behavior appropriate     Bilateral Hip Exam    right Hip Exam   right hip exam performed same as contralateral hip and is normal.     left Hip Exam   Tenderness   The patient is experiencing tenderness in the greater trochanter.  Range of Motion   The patient has normal hip ROM.  Muscle Strength   Abduction: 4/5   Other   Erythema: absent  Sensation: normal  Pulse: present        Greater trochanteric bursitis of left hip  -     Large Joint Aspiration/Injection: L greater trochanteric bursa  -     triamcinolone acetonide injection 40 mg         Using an aseptic technique, I injected 5 cc of lidocaine 1% without and 1 cc of kenalog 40mg into the left Hip. The patient tolerated this well. I will have them return to clinic as needed.           [1]   Current Outpatient Medications on File Prior to Visit   Medication Sig Dispense Refill    acetaminophen (TYLENOL) 500  MG tablet Take 2 tablets (1,000 mg total) by mouth every 8 (eight) hours. (Patient taking differently: Take 1,000 mg by mouth every 8 (eight) hours.) 90 tablet 0    acetaminophen (TYLENOL) 500 MG tablet Take 2 tablets (1,000 mg total) by mouth every 8 (eight) hours. 90 tablet 0    alogliptin (NESINA) 25 mg Tab Take 1 tablet by mouth once daily.      ascorbic acid, vitamin C, (VITAMIN C) 500 MG tablet Take 500 mg by mouth once daily.      aspirin (ECOTRIN) 325 MG EC tablet Take 1 tablet (325 mg total) by mouth once daily. 30 tablet 0    aspirin 325 MG tablet Take 325 mg by mouth once daily. DUPLICATE      cyanocobalamin (VITAMIN B-12) 1000 MCG tablet Take 100 mcg by mouth once daily.      empagliflozin (JARDIANCE) 25 mg tablet Take 25 mg by mouth once daily.      flash glucose scanning reader (FREESTYLE NATALIIA 2 READER) Misc 1 each by Other route.      flash glucose sensor (FREESTYLE NATALIIA 2 SENSOR) Kit 1 each by Other route.      gabapentin (NEURONTIN) 300 MG capsule Take 300 mg by mouth 3 (three) times daily.      ibuprofen (ADVIL,MOTRIN) 800 MG tablet Take 1 tablet (800 mg total) by mouth 3 (three) times daily. 90 tablet 0    ibuprofen (ADVIL,MOTRIN) 800 MG tablet Take 1 tablet (800 mg total) by mouth 3 (three) times daily. 90 tablet 0    multivitamin (THERAGRAN) per tablet Take 1 tablet by mouth once daily.      oxyCODONE (ROXICODONE) 15 MG Tab Take 1 tablet (15 mg total) by mouth every 4 (four) hours as needed for Pain. (Patient taking differently: Take 15 mg by mouth every 4 (four) hours as needed for Pain.) 44 tablet 0    oxyCODONE (ROXICODONE) 15 MG Tab Take 1 tablet (15 mg total) by mouth every 4 (four) hours as needed for Pain. 33 tablet 0    pantoprazole (PROTONIX) 40 MG tablet Take 40 mg by mouth once daily.      rosuvastatin (CRESTOR) 40 MG Tab Take 40 mg by mouth once daily.      sacubitriL-valsartan (ENTRESTO) 24-26 mg per tablet TAKE ONE 24/26MG TABLET BY MOUTH TWICE A DAY FOR CHRONIC HEART FAILURE       sennosides 8.6 mg Cap Take 8.6 mg by mouth daily as needed (constipation). Indications: constipation      SITagliptin phosphate (JANUVIA) 100 MG Tab Take 100 mg by mouth once daily.      traMADoL (ULTRAM) 50 mg tablet Take 1 tablet (50 mg total) by mouth every 4 (four) hours as needed for Pain. (Patient taking differently: Take 50 mg by mouth every 4 (four) hours as needed for Pain.) 44 tablet 0    traMADoL (ULTRAM) 50 mg tablet Take 1 tablet (50 mg total) by mouth every 4 (four) hours as needed for Pain. 44 tablet 0    traZODone (DESYREL) 100 MG tablet Take 100 mg by mouth every evening.      zinc gluconate 50 mg tablet Take 50 mg by mouth once daily.       Current Facility-Administered Medications on File Prior to Visit   Medication Dose Route Frequency Provider Last Rate Last Admin    sodium chloride 0.9% flush 10 mL  10 mL Intravenous Q6H PRN Jorge Trevizo MD        sodium chloride 0.9% flush 10 mL  10 mL Intravenous Q6H PRN Jorge Trevizo MD       [2]   Social History  Socioeconomic History    Marital status:    Tobacco Use    Smoking status: Never    Smokeless tobacco: Never   Substance and Sexual Activity    Alcohol use: Yes     Alcohol/week: 2.0 standard drinks of alcohol     Types: 2 Cans of beer per week    Drug use: No    Sexual activity: Yes     Partners: Female     Social Drivers of Health     Financial Resource Strain: Low Risk  (2/12/2025)    Overall Financial Resource Strain (CARDIA)     Difficulty of Paying Living Expenses: Not hard at all   Food Insecurity: No Food Insecurity (2/12/2025)    Hunger Vital Sign     Worried About Running Out of Food in the Last Year: Never true     Ran Out of Food in the Last Year: Never true   Transportation Needs: No Transportation Needs (3/24/2025)    OASIS : Transportation     Lack of Transportation (Medical): No     Lack of Transportation (Non-Medical): No     Patient Unable or Declines to Respond: No   Physical Activity: Inactive (2/12/2025)     Exercise Vital Sign     Days of Exercise per Week: 0 days     Minutes of Exercise per Session: 0 min   Stress: No Stress Concern Present (2/12/2025)    [employer] - Occupational Stress Questionnaire     Feeling of Stress : Only a little   Housing Stability: Low Risk  (2/12/2025)    Housing Stability Vital Sign     Unable to Pay for Housing in the Last Year: No     Homeless in the Last Year: No

## 2025-04-11 NOTE — PROCEDURES
Large Joint Aspiration/Injection: L greater trochanteric bursa    Date/Time: 4/11/2025 11:20 AM    Performed by: Maddison Lares FNP  Authorized by: Maddison Lares FNP    Consent Done?:  Yes (Verbal)  Indications:  Pain  Timeout: prior to procedure the correct patient, procedure, and site was verified    Prep: patient was prepped and draped in usual sterile fashion    Local anesthetic:  Lidocaine 1% without epinephrine  Anesthetic total (ml):  5      Details:  Needle Size:  21 G  Approach:  Lateral  Location:  Hip  Site:  L greater trochanteric bursa  Medications:  40 mg triamcinolone acetonide 40 mg/mL  Patient tolerance:  Patient tolerated the procedure well with no immediate complications

## 2025-05-26 DIAGNOSIS — M25.569 KNEE PAIN, UNSPECIFIED CHRONICITY, UNSPECIFIED LATERALITY: Primary | ICD-10-CM

## 2025-05-29 ENCOUNTER — HOSPITAL ENCOUNTER (OUTPATIENT)
Dept: RADIOLOGY | Facility: HOSPITAL | Age: 70
Discharge: HOME | End: 2025-05-29
Attending: NURSE PRACTITIONER
Payer: MEDICARE

## 2025-05-29 ENCOUNTER — OFFICE VISIT (OUTPATIENT)
Dept: ORTHOPEDICS | Facility: CLINIC | Age: 70
End: 2025-05-29
Payer: MEDICARE

## 2025-05-29 VITALS
HEIGHT: 73 IN | WEIGHT: 160.06 LBS | BODY MASS INDEX: 21.21 KG/M2 | HEART RATE: 88 BPM | DIASTOLIC BLOOD PRESSURE: 86 MMHG | SYSTOLIC BLOOD PRESSURE: 124 MMHG

## 2025-05-29 DIAGNOSIS — S86.911A KNEE STRAIN, RIGHT, INITIAL ENCOUNTER: Primary | ICD-10-CM

## 2025-05-29 DIAGNOSIS — M25.569 KNEE PAIN, UNSPECIFIED CHRONICITY, UNSPECIFIED LATERALITY: ICD-10-CM

## 2025-05-29 DIAGNOSIS — M70.51 PES ANSERINUS BURSITIS OF RIGHT KNEE: ICD-10-CM

## 2025-05-29 DIAGNOSIS — Z96.651 S/P REVISION OF TOTAL KNEE, RIGHT: ICD-10-CM

## 2025-05-29 PROCEDURE — 73560 X-RAY EXAM OF KNEE 1 OR 2: CPT | Mod: TC,PO,LT

## 2025-05-29 PROCEDURE — 73560 X-RAY EXAM OF KNEE 1 OR 2: CPT | Mod: 26,59,LT, | Performed by: RADIOLOGY

## 2025-05-29 PROCEDURE — 99214 OFFICE O/P EST MOD 30 MIN: CPT | Mod: S$PBB,,, | Performed by: NURSE PRACTITIONER

## 2025-05-29 PROCEDURE — 99999 PR PBB SHADOW E&M-EST. PATIENT-LVL IV: CPT | Mod: PBBFAC,,, | Performed by: NURSE PRACTITIONER

## 2025-05-29 PROCEDURE — 73562 X-RAY EXAM OF KNEE 3: CPT | Mod: 26,RT,, | Performed by: RADIOLOGY

## 2025-05-29 PROCEDURE — 99214 OFFICE O/P EST MOD 30 MIN: CPT | Mod: PBBFAC,25,PO | Performed by: NURSE PRACTITIONER

## 2025-05-29 NOTE — PROGRESS NOTES
Chief Complaint   Patient presents with    Right Knee - Pain     History of Present Illness    Abdulaziz presents for evaluation of right knee pain and swelling s/p multiple total knee replacements. Current symptoms include swelling and pain on the inside and bottom portion of the knee. His knee feels heavy, causing stumbling while walking. He has difficulty fully extending the right knee and does not wear a knee brace currently.    He reports some discomfort in the left hip but states it is not severe.    He denies any new injuries or incidents related to the knee pain.      ROS:  Musculoskeletal: +joint pain, +joint swelling, +limb pain, +difficulty walking, +pain with movement, +limited movement           Past Medical History:   Diagnosis Date    Anemia     Carpal tunnel syndrome, left     Cervical radiculopathy     Chronic Heart Failure     Chronic low back pain     Colon polyp     Coronary artery disease     DDD (degenerative disc disease), lumbar     Diabetes mellitus, type 2     Encounter for blood transfusion     Foot drop, right     Gallstone     & sludge    GERD (gastroesophageal reflux disease)     Hepatic steatosis     Hepatomegaly     History of DVT 10/29/2013    s/p R knee sx    HNP (herniated nucleus pulposus), lumbar     Hyperlipidemia     Hypertension     Lumbar pseudoarthrosis     Lumbar radiculopathy     Neuropathy     Osteoarthritis     Pancreatic cyst     seeing Dr. Hernandez    PVC (premature ventricular contraction)     Recurrent herniation of lumbar disc     TIA (transient ischemic attack) 2010     Past Surgical History:   Procedure Laterality Date    ARTHROPLASTY Right 06/23/2014    Patella    BONE GRAFT      Graft from LEFT hip to RIGHT foot    CARPAL TUNNEL RELEASE Left     CATARACT EXTRACTION Bilateral     CHOLECYSTECTOMY  01/03/2023    COLONOSCOPY  [place]    [facility]    COLONOSCOPY N/A 7/3/2024    Procedure: COLONOSCOPY;  Surgeon: Mikal Page MD;  Location:  [facility] ENDO;  Service: Gastroenterology;  Laterality: N/A;    CORONARY ARTERY BYPASS GRAFT  05/2023    x3 vessels, [place]    ELBOW SURGERY Bilateral     ENDOSCOPIC ULTRASOUND OF UPPER GASTROINTESTINAL TRACT Left 11/23/2022    Procedure: ULTRASOUND, UPPER GI TRACT, ENDOSCOPIC;  Surgeon: Zain Hernandez MD;  Location: [facility] ENDO;  Service: Endoscopy;  Laterality: Left;  GIF plus Linear    ESOPHAGOGASTRODUODENOSCOPY N/A 08/31/2022    Procedure: EGD (ESOPHAGOGASTRODUODENOSCOPY);  Surgeon: Zain Hernandez MD;  Location: [facility] ENDO;  Service: Endoscopy;  Laterality: N/A;    ESOPHAGOGASTRODUODENOSCOPY N/A 11/23/2022    Procedure: EGD (ESOPHAGOGASTRODUODENOSCOPY);  Surgeon: Zain Hernandez MD;  Location: [facility] ENDO;  Service: Endoscopy;  Laterality: N/A;    CIRO FILTER PLACEMENT  2015    HIP FUSION Right 04/2018    INGUINAL HERNIA REPAIR  1967    JOINT REPLACEMENT      bilateral hip replacement    KNEE ARTHROPLASTY Right 02/01/2016    Revision    KNEE ARTHROSCOPY Right 07/22/2013    KNEE ARTHROSCOPY Right 10/06/2014    KNEE SURGERY Right 08/01/2013    ARTHROCENTESIS    LUMBAR EPIDURAL INJECTION      LUMBAR FUSION  06/09/2022    WINTER L3-L5,PLIF L5-S1 with Possible Direct Decompression L3-S1    LUMBAR FUSION  10/2023    L3-S1, Revision d/t displaced screws    MICRODISCECTOMY OF SPINE  12/01/2016    LUMBAR    MICRODISCECTOMY OF SPINE  12/27/2018    LUMBAR    NASAL SEPTUM SURGERY  1979    Deviated septum    REVISION TOTAL HIP ARTHROPLASTY Right 1/13/2025    Procedure: REVISION, TOTAL ARTHROPLASTY, HIP;  Surgeon: Jorge Trevizo MD;  Location: [facility] OR;  Service: Orthopedics;  Laterality: Right;    REVISION TOTAL HIP ARTHROPLASTY Left 3/10/2025    Procedure: REVISION, TOTAL ARTHROPLASTY, HIP;  Surgeon: Jorge Trevizo MD;  Location: [facility] OR;  Service: Orthopedics;  Laterality: Left;    ROTATOR CUFF REPAIR Right 2022    SCAPHOID FRACTURE SURGERY      Navicular Fusion    SI JOINT FUSION Right 04/05/2018     TONSILLECTOMY      TOTAL HIP ARTHROPLASTY Right 1993    TOTAL HIP ARTHROPLASTY Left 1992    UPPER GASTROINTESTINAL ENDOSCOPY       Medications Ordered Prior to Encounter[1]  Review of patient's allergies indicates:  No Known Allergies  Family History   Problem Relation Name Age of Onset    Heart disease Mother      Arthritis Mother      Stroke Father      Heart disease Father      Heart attack Father      Heart disease Brother      Leukemia Brother      Collagen disease Neg Hx      Colon cancer Neg Hx      Crohn's disease Neg Hx      Esophageal cancer Neg Hx      Ulcerative colitis Neg Hx      Stomach cancer Neg Hx       Social History[2]    Review of Systems:  Constitutional:  Denies fever or chills   Eyes:  Denies change in visual acuity   HENT:  Denies nasal congestion or sore throat   Respiratory:  Denies cough or shortness of breath   Cardiovascular:  Denies chest pain or edema   GI:  Denies abdominal pain, nausea, vomiting, bloody stools or diarrhea   :  Denies dysuria   Integument:  Denies rash   Neurologic:  Denies headache, focal weakness or sensory changes   Endocrine:  Denies polyuria or polydipsia   Lymphatic:  Denies swollen glands   Psychiatric:  Denies depression or anxiety     Physical Exam:   Constitutional:  Well developed, well nourished, no acute distress, non-toxic appearance   Integument:  Well hydrated  Neurologic:  Alert & oriented x 3  Psychiatric:  Speech and behavior appropriate     Bilateral Knee Exam    right Knee Exam     Tenderness   The patient is experiencing tenderness in the medial joint line.    Range of Motion   Extension: abnormal   Flexion: abnormal     Muscle Strength     The patient has normal knee strength.    Tests   Yaniv:  Medial - positive   Lachman:  Anterior - negative      Varus: negative  Valgus: negative  Patellar Apprehension: negative    Other   Erythema: absent  Sensation: normal  Pulse: present  Swelling: mild      left Knee Exam   left knee exam performed  same as contralateral side and is normal.            X-rays were performed, personally reviewed by me and findings discussed with the patient.  3 views of the right knee show implants intact; no evidence of loosening.           Knee strain, right, initial encounter    Pes anserinus bursitis of right knee    S/P revision of total knee, right      Discussed we could inject pes anserinus bursa of his right knee, but will hold off for now and have him try brace first.   Will place in playmaker brace for 6 weeks  Follow up with MD Santhosh in 6 weeks.            [1]   Current Outpatient Medications on File Prior to Visit   Medication Sig Dispense Refill    acetaminophen (TYLENOL) 500 MG tablet Take 2 tablets (1,000 mg total) by mouth every 8 (eight) hours. (Patient taking differently: Take 1,000 mg by mouth every 8 (eight) hours.) 90 tablet 0    acetaminophen (TYLENOL) 500 MG tablet Take 2 tablets (1,000 mg total) by mouth every 8 (eight) hours. 90 tablet 0    alogliptin (NESINA) 25 mg Tab Take 1 tablet by mouth once daily.      ascorbic acid, vitamin C, (VITAMIN C) 500 MG tablet Take 500 mg by mouth once daily.      aspirin (ECOTRIN) 325 MG EC tablet Take 1 tablet (325 mg total) by mouth once daily. 30 tablet 0    aspirin 325 MG tablet Take 325 mg by mouth once daily. DUPLICATE      cyanocobalamin (VITAMIN B-12) 1000 MCG tablet Take 100 mcg by mouth once daily.      empagliflozin (JARDIANCE) 25 mg tablet Take 25 mg by mouth once daily.      flash glucose scanning reader (FREESTYLE NATALIIA 2 READER) Misc 1 each by Other route.      flash glucose sensor (FREESTYLE NATALIIA 2 SENSOR) Kit 1 each by Other route.      gabapentin (NEURONTIN) 300 MG capsule Take 300 mg by mouth 3 (three) times daily.      ibuprofen (ADVIL,MOTRIN) 800 MG tablet Take 1 tablet (800 mg total) by mouth 3 (three) times daily. 90 tablet 0    ibuprofen (ADVIL,MOTRIN) 800 MG tablet Take 1 tablet (800 mg total) by mouth 3 (three) times daily. 90 tablet 0     multivitamin (THERAGRAN) per tablet Take 1 tablet by mouth once daily.      oxyCODONE (ROXICODONE) 15 MG Tab Take 1 tablet (15 mg total) by mouth every 4 (four) hours as needed for Pain. (Patient taking differently: Take 15 mg by mouth every 4 (four) hours as needed for Pain.) 44 tablet 0    oxyCODONE (ROXICODONE) 15 MG Tab Take 1 tablet (15 mg total) by mouth every 4 (four) hours as needed for Pain. 33 tablet 0    pantoprazole (PROTONIX) 40 MG tablet Take 40 mg by mouth once daily.      rosuvastatin (CRESTOR) 40 MG Tab Take 40 mg by mouth once daily.      sacubitriL-valsartan (ENTRESTO) 24-26 mg per tablet TAKE ONE 24/26MG TABLET BY MOUTH TWICE A DAY FOR CHRONIC HEART FAILURE      sennosides 8.6 mg Cap Take 8.6 mg by mouth daily as needed (constipation). Indications: constipation      SITagliptin phosphate (JANUVIA) 100 MG Tab Take 100 mg by mouth once daily.      traMADoL (ULTRAM) 50 mg tablet Take 1 tablet (50 mg total) by mouth every 4 (four) hours as needed for Pain. (Patient taking differently: Take 50 mg by mouth every 4 (four) hours as needed for Pain.) 44 tablet 0    traMADoL (ULTRAM) 50 mg tablet Take 1 tablet (50 mg total) by mouth every 4 (four) hours as needed for Pain. 44 tablet 0    traZODone (DESYREL) 100 MG tablet Take 100 mg by mouth every evening.      zinc gluconate 50 mg tablet Take 50 mg by mouth once daily.       Current Facility-Administered Medications on File Prior to Visit   Medication Dose Route Frequency Provider Last Rate Last Admin    sodium chloride 0.9% flush 10 mL  10 mL Intravenous Q6H PRN Jorge Trevizo MD        sodium chloride 0.9% flush 10 mL  10 mL Intravenous Q6H PRN Jorge Trevizo MD       [2]   Social History  Socioeconomic History    Marital status:    Tobacco Use    Smoking status: Never    Smokeless tobacco: Never   Substance and Sexual Activity    Alcohol use: Yes     Alcohol/week: 2.0 standard drinks of alcohol     Types: 2 Cans of beer per week    Drug use:  No    Sexual activity: Yes     Partners: Female     Social Drivers of Health     Financial Resource Strain: Low Risk  (2/12/2025)    Overall Financial Resource Strain (CARDIA)     Difficulty of Paying Living Expenses: Not hard at all   Food Insecurity: No Food Insecurity (2/12/2025)    Hunger Vital Sign     Worried About Running Out of Food in the Last Year: Never true     Ran Out of Food in the Last Year: Never true   Transportation Needs: No Transportation Needs (3/24/2025)    OASIS : Transportation     Lack of Transportation (Medical): No     Lack of Transportation (Non-Medical): No     Patient Unable or Declines to Respond: No   Physical Activity: Inactive (2/12/2025)    Exercise Vital Sign     Days of Exercise per Week: 0 days     Minutes of Exercise per Session: 0 min   Stress: No Stress Concern Present (2/12/2025)    [employer] - Occupational Stress Questionnaire     Feeling of Stress : Only a little   Housing Stability: Low Risk  (2/12/2025)    Housing Stability Vital Sign     Unable to Pay for Housing in the Last Year: No     Homeless in the Last Year: No

## 2025-07-15 ENCOUNTER — OFFICE VISIT (OUTPATIENT)
Dept: ORTHOPEDICS | Facility: CLINIC | Age: 70
End: 2025-07-15
Payer: MEDICARE

## 2025-07-15 VITALS — BODY MASS INDEX: 21.21 KG/M2 | WEIGHT: 160.06 LBS | HEIGHT: 73 IN

## 2025-07-15 DIAGNOSIS — M76.31 IT BAND SYNDROME, RIGHT: Primary | ICD-10-CM

## 2025-07-15 PROCEDURE — 99215 OFFICE O/P EST HI 40 MIN: CPT | Mod: PBBFAC,PO | Performed by: ORTHOPAEDIC SURGERY

## 2025-07-15 PROCEDURE — 99214 OFFICE O/P EST MOD 30 MIN: CPT | Mod: S$PBB,,, | Performed by: ORTHOPAEDIC SURGERY

## 2025-07-15 PROCEDURE — 99999 PR PBB SHADOW E&M-EST. PATIENT-LVL V: CPT | Mod: PBBFAC,,, | Performed by: ORTHOPAEDIC SURGERY

## 2025-07-15 NOTE — PROGRESS NOTES
Chief Complaint   Patient presents with    Right Knee - Pain      HPI:   This is a 69 y.o. who presents to clinic today for right hip pain for the past 2 weeks after no known trauma. Complains of pain while sleeping on side and when descending stairs. Pain is dull. No numbness or tingling. No associated signs or symptoms.      Past Medical History:   Diagnosis Date    Anemia     Carpal tunnel syndrome, left     Cervical radiculopathy     Chronic Heart Failure     Chronic low back pain     Colon polyp     Coronary artery disease     DDD (degenerative disc disease), lumbar     Diabetes mellitus, type 2     Encounter for blood transfusion     Foot drop, right     Gallstone     & sludge    GERD (gastroesophageal reflux disease)     Hepatic steatosis     Hepatomegaly     History of DVT 10/29/2013    s/p R knee sx    HNP (herniated nucleus pulposus), lumbar     Hyperlipidemia     Hypertension     Lumbar pseudoarthrosis     Lumbar radiculopathy     Neuropathy     Osteoarthritis     Pancreatic cyst     seeing Dr. Hernandez    PVC (premature ventricular contraction)     Recurrent herniation of lumbar disc     TIA (transient ischemic attack) 2010     Past Surgical History:   Procedure Laterality Date    ARTHROPLASTY Right 06/23/2014    Patella    BONE GRAFT      Graft from LEFT hip to RIGHT foot    CARPAL TUNNEL RELEASE Left     CATARACT EXTRACTION Bilateral     CHOLECYSTECTOMY  01/03/2023    COLONOSCOPY  2007    [facility]    COLONOSCOPY N/A 7/3/2024    Procedure: COLONOSCOPY;  Surgeon: Mikal Page MD;  Location: [facility];  Service: Gastroenterology;  Laterality: N/A;    CORONARY ARTERY BYPASS GRAFT  05/2023    x3 vessels, [facility]    ELBOW SURGERY Bilateral     ENDOSCOPIC ULTRASOUND OF UPPER GASTROINTESTINAL TRACT Left 11/23/2022    Procedure: ULTRASOUND, UPPER GI TRACT, ENDOSCOPIC;  Surgeon: Zain Hernandez MD;  Location: [facility];  Service: Endoscopy;  Laterality: Left;  GIF plus  Linear    ESOPHAGOGASTRODUODENOSCOPY N/A 08/31/2022    Procedure: EGD (ESOPHAGOGASTRODUODENOSCOPY);  Surgeon: Zain Hernandez MD;  Location: [facility];  Service: Endoscopy;  Laterality: N/A;    ESOPHAGOGASTRODUODENOSCOPY N/A 11/23/2022    Procedure: EGD (ESOPHAGOGASTRODUODENOSCOPY);  Surgeon: aZin Hernandez MD;  [facility];  Service: Endoscopy;  Laterality: N/A;    CIRO FILTER PLACEMENT  2015    HIP FUSION Right 04/2018    INGUINAL HERNIA REPAIR  1967    JOINT REPLACEMENT      bilateral hip replacement    KNEE ARTHROPLASTY Right 02/01/2016    Revision    KNEE ARTHROSCOPY Right 07/22/2013    KNEE ARTHROSCOPY Right 10/06/2014    KNEE SURGERY Right 08/01/2013    ARTHROCENTESIS    LUMBAR EPIDURAL INJECTION      LUMBAR FUSION  06/09/2022    WINTER L3-L5,PLIF L5-S1 with Possible Direct Decompression L3-S1    LUMBAR FUSION  10/2023    L3-S1, Revision d/t displaced screws    MICRODISCECTOMY OF SPINE  12/01/2016    LUMBAR    MICRODISCECTOMY OF SPINE  12/27/2018    LUMBAR    NASAL SEPTUM SURGERY  1979    Deviated septum    REVISION TOTAL HIP ARTHROPLASTY Right 1/13/2025    Procedure: REVISION, TOTAL ARTHROPLASTY, HIP;  Surgeon: Jorge Trevizo MD;  Location: [facility] OR;  Service: Orthopedics;  Laterality: Right;    REVISION TOTAL HIP ARTHROPLASTY Left 3/10/2025    Procedure: REVISION, TOTAL ARTHROPLASTY, HIP;  Surgeon: Jorge Trevizo MD;  Location: [facility] OR;  Service: Orthopedics;  Laterality: Left;    ROTATOR CUFF REPAIR Right 2022    SCAPHOID FRACTURE SURGERY      Navicular Fusion    SI JOINT FUSION Right 04/05/2018    TONSILLECTOMY      TOTAL HIP ARTHROPLASTY Right 1993    TOTAL HIP ARTHROPLASTY Left 1992    UPPER GASTROINTESTINAL ENDOSCOPY       Medications Ordered Prior to Encounter[1]  Review of patient's allergies indicates:  No Known Allergies  Family History   Problem Relation Name Age of Onset    Heart disease Mother      Arthritis Mother      Stroke Father      Heart disease Father      Heart  attack Father      Heart disease Brother      Leukemia Brother      Collagen disease Neg Hx      Colon cancer Neg Hx      Crohn's disease Neg Hx      Esophageal cancer Neg Hx      Ulcerative colitis Neg Hx      Stomach cancer Neg Hx       Social History[2]    Review of Systems:  Constitutional:  Denies fever or chills   Eyes:  Denies change in visual acuity   HENT:  Denies nasal congestion or sore throat   Respiratory:  Denies cough or shortness of breath   Cardiovascular:  Denies chest pain or edema   GI:  Denies abdominal pain, nausea, vomiting, bloody stools or diarrhea   :  Denies dysuria   Integument:  Denies rash   Neurologic:  Denies headache, focal weakness or sensory changes   Endocrine:  Denies polyuria or polydipsia   Lymphatic:  Denies swollen glands   Psychiatric:  Denies depression or anxiety     Physical Exam:   Constitutional:  Well developed, well nourished, no acute distress, non-toxic appearance   Integument:  Well hydrated, no rash   Lymphatic:  No lymphadenopathy noted   Neurologic:  Alert & oriented x 3  Psychiatric:  Speech and behavior appropriate     Bilateral Hip Exam    left Hip Exam   left hip exam performed same as contralateral hip and is normal.     right Hip Exam   Tenderness   The patient is experiencing tenderness in the ITB and at Gerdy's tubercle.  Range of Motion   The patient has normal hip ROM.  Muscle Strength   Abduction: 4/5   Other   Erythema: absent  Sensation: normal  Pulse: present    X-rays were personally reviewed by me and findings discussed with the patient.  2 views of the right hip show no fractures or dislocations    It band syndrome, right  -     Ambulatory Referral/Consult to Physical Therapy         Will refer to PT for dry needling. RTC 2 months.            [1]   Current Outpatient Medications on File Prior to Visit   Medication Sig Dispense Refill    acetaminophen (TYLENOL) 500 MG tablet Take 2 tablets (1,000 mg total) by mouth every 8 (eight) hours.  (Patient taking differently: Take 1,000 mg by mouth every 8 (eight) hours.) 90 tablet 0    acetaminophen (TYLENOL) 500 MG tablet Take 2 tablets (1,000 mg total) by mouth every 8 (eight) hours. 90 tablet 0    alogliptin (NESINA) 25 mg Tab Take 1 tablet by mouth once daily.      ascorbic acid, vitamin C, (VITAMIN C) 500 MG tablet Take 500 mg by mouth once daily.      aspirin (ECOTRIN) 325 MG EC tablet Take 1 tablet (325 mg total) by mouth once daily. 30 tablet 0    aspirin 325 MG tablet Take 325 mg by mouth once daily. DUPLICATE      cyanocobalamin (VITAMIN B-12) 1000 MCG tablet Take 100 mcg by mouth once daily.      empagliflozin (JARDIANCE) 25 mg tablet Take 25 mg by mouth once daily.      flash glucose scanning reader (FREESTYLE NATALIIA 2 READER) Misc 1 each by Other route.      flash glucose sensor (FREESTYLE NATALIIA 2 SENSOR) Kit 1 each by Other route.      gabapentin (NEURONTIN) 300 MG capsule Take 300 mg by mouth 3 (three) times daily.      ibuprofen (ADVIL,MOTRIN) 800 MG tablet Take 1 tablet (800 mg total) by mouth 3 (three) times daily. 90 tablet 0    ibuprofen (ADVIL,MOTRIN) 800 MG tablet Take 1 tablet (800 mg total) by mouth 3 (three) times daily. 90 tablet 0    multivitamin (THERAGRAN) per tablet Take 1 tablet by mouth once daily.      oxyCODONE (ROXICODONE) 15 MG Tab Take 1 tablet (15 mg total) by mouth every 4 (four) hours as needed for Pain. (Patient taking differently: Take 15 mg by mouth every 4 (four) hours as needed for Pain.) 44 tablet 0    oxyCODONE (ROXICODONE) 15 MG Tab Take 1 tablet (15 mg total) by mouth every 4 (four) hours as needed for Pain. 33 tablet 0    pantoprazole (PROTONIX) 40 MG tablet Take 40 mg by mouth once daily.      rosuvastatin (CRESTOR) 40 MG Tab Take 40 mg by mouth once daily.      sacubitriL-valsartan (ENTRESTO) 24-26 mg per tablet TAKE ONE 24/26MG TABLET BY MOUTH TWICE A DAY FOR CHRONIC HEART FAILURE      sennosides 8.6 mg Cap Take 8.6 mg by mouth daily as needed  (constipation). Indications: constipation      SITagliptin phosphate (JANUVIA) 100 MG Tab Take 100 mg by mouth once daily.      traMADoL (ULTRAM) 50 mg tablet Take 1 tablet (50 mg total) by mouth every 4 (four) hours as needed for Pain. (Patient taking differently: Take 50 mg by mouth every 4 (four) hours as needed for Pain.) 44 tablet 0    traMADoL (ULTRAM) 50 mg tablet Take 1 tablet (50 mg total) by mouth every 4 (four) hours as needed for Pain. 44 tablet 0    traZODone (DESYREL) 100 MG tablet Take 100 mg by mouth every evening.      zinc gluconate 50 mg tablet Take 50 mg by mouth once daily.       Current Facility-Administered Medications on File Prior to Visit   Medication Dose Route Frequency Provider Last Rate Last Admin    sodium chloride 0.9% flush 10 mL  10 mL Intravenous Q6H PRN Jorge Trevizo MD        sodium chloride 0.9% flush 10 mL  10 mL Intravenous Q6H PRN Jorge Trevizo MD       [2]   Social History  Socioeconomic History    Marital status:    Tobacco Use    Smoking status: Never    Smokeless tobacco: Never   Substance and Sexual Activity    Alcohol use: Yes     Alcohol/week: 2.0 standard drinks of alcohol     Types: 2 Cans of beer per week    Drug use: No    Sexual activity: Yes     Partners: Female     Social Drivers of Health     Financial Resource Strain: Low Risk  (2/12/2025)    Overall Financial Resource Strain (CARDIA)     Difficulty of Paying Living Expenses: Not hard at all   Food Insecurity: No Food Insecurity (2/12/2025)    Hunger Vital Sign     Worried About Running Out of Food in the Last Year: Never true     Ran Out of Food in the Last Year: Never true   Transportation Needs: No Transportation Needs (3/24/2025)    OASIS : Transportation     Lack of Transportation (Medical): No     Lack of Transportation (Non-Medical): No     Patient Unable or Declines to Respond: No   Physical Activity: Inactive (2/12/2025)    Exercise Vital Sign     Days of Exercise per Week: 0 days      Minutes of Exercise per Session: 0 min   Stress: No Stress Concern Present (2/12/2025)    [employer] - Occupational Stress Questionnaire     Feeling of Stress : Only a little   Housing Stability: Low Risk  (2/12/2025)    Housing Stability Vital Sign     Unable to Pay for Housing in the Last Year: No     Homeless in the Last Year: No